# Patient Record
Sex: MALE | Race: WHITE | NOT HISPANIC OR LATINO | Employment: FULL TIME | URBAN - METROPOLITAN AREA
[De-identification: names, ages, dates, MRNs, and addresses within clinical notes are randomized per-mention and may not be internally consistent; named-entity substitution may affect disease eponyms.]

---

## 2017-01-10 ENCOUNTER — OFFICE VISIT (OUTPATIENT)
Dept: FAMILY MEDICINE CLINIC | Facility: CLINIC | Age: 52
End: 2017-01-10

## 2017-01-10 VITALS
HEART RATE: 83 BPM | BODY MASS INDEX: 37.03 KG/M2 | SYSTOLIC BLOOD PRESSURE: 120 MMHG | RESPIRATION RATE: 20 BRPM | OXYGEN SATURATION: 98 % | TEMPERATURE: 98.3 F | WEIGHT: 250 LBS | DIASTOLIC BLOOD PRESSURE: 70 MMHG | HEIGHT: 69 IN

## 2017-01-10 DIAGNOSIS — J20.9 ACUTE BRONCHITIS, UNSPECIFIED ORGANISM: Primary | ICD-10-CM

## 2017-01-10 PROCEDURE — 99213 OFFICE O/P EST LOW 20 MIN: CPT | Performed by: FAMILY MEDICINE

## 2017-01-10 RX ORDER — CEFUROXIME AXETIL 250 MG/1
250 TABLET ORAL 2 TIMES DAILY
Qty: 20 TABLET | Refills: 0 | Status: SHIPPED | OUTPATIENT
Start: 2017-01-10 | End: 2017-04-07

## 2017-01-10 NOTE — MR AVS SNAPSHOT
Tre Lau   1/10/2017 11:00 AM   Office Visit    Dept Phone:  [phone]   Encounter #:  [id number]    Provider:  Kana Farris MD   Department:  [facility] MED                Your Full Care Plan              Today's Medication Changes          These changes are accurate as of: 1/10/17 10:46 AM.  If you have any questions, ask your nurse or doctor.               New Medication(s)Ordered:     cefuroxime 250 MG tablet   Commonly known as:  CEFTIN   Take 1 tablet by mouth 2 (Two) Times a Day.   Started by:  Kana Farris MD       HYDROcodone-homatropine 5-1.5 MG/5ML syrup   Commonly known as:  HYCODAN   Take 5 mL by mouth Every 6 (Six) Hours As Needed for cough. 1 tsp Q 6 Hr prn   Started by:  Kana Farris MD            Where to Get Your Medications      These medications were sent to [facility] [address] [place] [address] [id number] AT [facility] - [phone]  - [phone]   [id number], [facility] [address]    Hours:  24-hours Phone:  [phone]     cefuroxime 250 MG tablet         You can get these medications from any pharmacy     Bring a paper prescription for each of these medications     HYDROcodone-homatropine 5-1.5 MG/5ML syrup                  Your Updated Medication List          This list is accurate as of: 1/10/17 10:46 AM.  Always use your most recent med list.                aspirin 81 MG tablet       atorvastatin 20 MG tablet   Commonly known as:  LIPITOR   TAKE 1 TABLET BY MOUTH EVERY DAY       cefuroxime 250 MG tablet   Commonly known as:  CEFTIN   Take 1 tablet by mouth 2 (Two) Times a Day.       HYDROcodone-homatropine 5-1.5 MG/5ML syrup   Commonly known as:  HYCODAN   Take 5 mL by mouth Every 6 (Six) Hours As Needed for cough. 1 tsp Q 6 Hr prn       metoprolol tartrate 25 MG tablet   Commonly known as:  LOPRESSOR   TAKE 1 TABLET BY MOUTH TWICE DAILY       "valsartan 80 MG tablet   Commonly known as:  DIOVAN   TAKE 1 TABLET BY MOUTH DAILY               You Were Diagnosed With        Codes Comments    Acute bronchitis, unspecified organism    -  Primary ICD-10-CM: J20.9  ICD-9-CM: 466.0       Instructions     None    Patient Instructions History      Upcoming Appointments     Visit Type Date Time Department    OFFICE VISIT 1/10/2017 11:00 AM ASHWIN ARTHUR      [employer] Signup     [facility] [employer] allows you to send messages to your doctor, view your test results, renew your prescriptions, schedule appointments, and more. To sign up, go to [employer] and click on the Sign Up Now link in the New User? box. Enter your [employer] Activation Code exactly as it appears below along with the last four digits of your Social Security Number and your Date of Birth () to complete the sign-up process. If you do not sign up before the expiration date, you must request a new code.    [employer] Activation Code: [email or web address]  Expires: 2017 10:46 AM    If you have questions, you can email [email or web address] or call [phone] to talk to our [employer] staff. Remember, [employer] is NOT to be used for urgent needs. For medical emergencies, dial 911.               Other Info from Your Visit           Your Appointments     Richy 10, 2017 11:00 AM EST   Office Visit with Kana Farris MD   [facility] (--)    [address] [place] [id number] [phone]           Arrive 15 minutes prior to appointment.              Allergies     No Known Allergies      Reason for Visit     Sinusitis     Cough           Vital Signs     Blood Pressure Pulse Temperature Respirations Height Weight    120/70 (BP Location: Left arm, Patient Position: Sitting) 83 98.3 °F (36.8 °C) (Oral) 20 69\" (175.3 cm) 250 lb (113 kg)    Oxygen Saturation Body Mass Index Smoking Status             98% 36.92 kg/m2 Former Smoker       "   Problems and Diagnoses Noted     Acute bronchitis    -  Primary

## 2017-01-10 NOTE — PROGRESS NOTES
SUBJECTIVE:  The patient is a 51-year-old white male who is here with a three-day history of productive greenish yellow sputum cough.  He is coughing so hard he gets a headache.  He is unaware of any fever.  He hurts from coughing but denies myalgias.  He had a flu shot.  No GI symptoms.  He has pressure in his face.     PAST MEDICAL HISTORY:  Reviewed.    REVIEW OF SYSTEMS:  Please see above; 14 point ROS otherwise negative.      OBJECTIVE: Vitals signs are reviewed and are stable.    HEENT: PERRLA.   Throat red.  TMs look okay.  Neck:  Supple.    Lungs:  Rhonchi are present in the left lung field.  No wheezes or rales.  Heart:  Regular rate and rhythm.    Abdomen:   Soft, nontender.    Extremities:  No cyanosis, clubbing or edema.      ASSESSMENT:     Acute bronchitis    PLAN:   Ceftin 250 twice a day.  Hydromet 1 teaspoon every 6 hours when necessary.  Mucinex.  Follow-up in a couple days if no better.  Notify sooner if worse or problems.    Much of this encounter note is an electronic transcription/translation of spoken language to printed text.  The electronic translation of spoken language may permit erroneous, or at times, nonsensical words or phrases to be inadvertently transcribed.  Although I have reviewed the note for such errors, some may still exist.

## 2017-01-18 RX ORDER — VALSARTAN 80 MG/1
TABLET ORAL
Qty: 30 TABLET | Refills: 0 | Status: SHIPPED | OUTPATIENT
Start: 2017-01-18 | End: 2017-02-16 | Stop reason: SDUPTHER

## 2017-02-08 RX ORDER — ATORVASTATIN CALCIUM 20 MG/1
TABLET, FILM COATED ORAL
Qty: 90 TABLET | Refills: 0 | Status: SHIPPED | OUTPATIENT
Start: 2017-02-08 | End: 2017-07-04 | Stop reason: SDUPTHER

## 2017-02-16 RX ORDER — VALSARTAN 80 MG/1
TABLET ORAL
Qty: 30 TABLET | Refills: 0 | Status: SHIPPED | OUTPATIENT
Start: 2017-02-16 | End: 2017-03-14 | Stop reason: SDUPTHER

## 2017-03-14 RX ORDER — VALSARTAN 80 MG/1
TABLET ORAL
Qty: 30 TABLET | Refills: 0 | Status: SHIPPED | OUTPATIENT
Start: 2017-03-14 | End: 2017-04-09 | Stop reason: SDUPTHER

## 2017-04-07 ENCOUNTER — OFFICE VISIT (OUTPATIENT)
Dept: CARDIOLOGY | Facility: CLINIC | Age: 52
End: 2017-04-07

## 2017-04-07 VITALS
BODY MASS INDEX: 38.51 KG/M2 | WEIGHT: 260 LBS | HEART RATE: 56 BPM | SYSTOLIC BLOOD PRESSURE: 114 MMHG | HEIGHT: 69 IN | DIASTOLIC BLOOD PRESSURE: 72 MMHG

## 2017-04-07 DIAGNOSIS — I48.0 PAROXYSMAL ATRIAL FIBRILLATION (HCC): Primary | ICD-10-CM

## 2017-04-07 DIAGNOSIS — I10 ESSENTIAL HYPERTENSION: ICD-10-CM

## 2017-04-07 PROCEDURE — 93000 ELECTROCARDIOGRAM COMPLETE: CPT | Performed by: INTERNAL MEDICINE

## 2017-04-07 PROCEDURE — 99213 OFFICE O/P EST LOW 20 MIN: CPT | Performed by: INTERNAL MEDICINE

## 2017-04-07 NOTE — PROGRESS NOTES
Date of Office Visit: 17  Encounter Provider: Dilshad Mann MD  Place of Service: [facility] CARDIOLOGY  Patient Name: Tre Lau  :1965      Chief Complaint   Patient presents with   • Atrial Fibrillation     History of Present Illness  HPI Comments: The patient is a 51-year-old gentleman who presented to the hospital with tachycardia,  shortness of breath, and some atypical chest pain, dizziness. He was found to be in rapid  atrial fibrillation. He converted back to sinus rhythm spontaneously, he ruled out. We  did a stress echocardiogram on him on 2015 that at rest showed no real significant  abnormality and with stress was normal. His TSH was negative. He was started on  metoprolol and aspirin. He comes in for follow up. He has been doing well. He has had  no further episodes of palpitations. No near syncope or stacy syncope. No chest pain or  pressure.  He's had no stroke type symptoms and no bleeding.  He is just now starting to get back and exercise she's worked out a structure so we can do that and take care of his child.  He also got a kayak is can a start using that.    Atrial Fibrillation   Symptoms are negative for dizziness and weakness. Past medical history includes atrial fibrillation and hyperlipidemia.   Hypertension   Pertinent negatives include no blurred vision, headaches or malaise/fatigue.   Hyperlipidemia   Pertinent negatives include no focal weakness or myalgias.         Past Medical History:   Diagnosis Date   • Atrial fibrillation    • Cervical discitis    • Hyperlipidemia    • Hypertension          Past Surgical History:   Procedure Laterality Date   • CERVICAL FUSION      Dr. Quan Reynolds   • CHOLECYSTECTOMY     • KNEE SURGERY Right 1985    X2, 1986         Current Outpatient Prescriptions on File Prior to Visit   Medication Sig Dispense Refill   • aspirin 81 MG tablet Take 1 tablet by mouth daily.     • atorvastatin (LIPITOR)  20 MG tablet TAKE 1 TABLET BY MOUTH EVERY DAY 90 tablet 0   • metoprolol tartrate (LOPRESSOR) 25 MG tablet TAKE 1 TABLET BY MOUTH TWICE DAILY 60 tablet 3   • valsartan (DIOVAN) 80 MG tablet TAKE 1 TABLET BY MOUTH EVERY DAY 30 tablet 0   • [DISCONTINUED] cefuroxime (CEFTIN) 250 MG tablet Take 1 tablet by mouth 2 (Two) Times a Day. 20 tablet 0   • [DISCONTINUED] HYDROcodone-homatropine (HYCODAN) 5-1.5 MG/5ML syrup Take 5 mL by mouth Every 6 (Six) Hours As Needed for cough. 1 tsp Q 6 Hr prn 120 mL 0     No current facility-administered medications on file prior to visit.          Social History     Social History   • Marital status:      Spouse name: N/A   • Number of children: 0   • Years of education: N/A     Occupational History   •       Social History Main Topics   • Smoking status: Former Smoker     Packs/day: 1.00     Years: 22.00     Types: Cigarettes     Quit date: 1/10/2007   • Smokeless tobacco: Not on file   • Alcohol use Yes      Comment: social   • Drug use: Not on file   • Sexual activity: Not on file     Other Topics Concern   • Not on file     Social History Narrative       Family History   Problem Relation Age of Onset   • Breast cancer Mother 47   • Diabetes Father    • Hypertension Father    • Heart attack Paternal Uncle    • Heart disease Paternal Grandmother    • Heart disease Paternal Grandfather          Review of Systems   Constitution: Negative for decreased appetite, diaphoresis, fever, weakness, malaise/fatigue, weight gain and weight loss.   HENT: Negative for congestion, headaches, hearing loss, nosebleeds and tinnitus.    Eyes: Negative for blurred vision, double vision, vision loss in left eye, vision loss in right eye and visual disturbance.   Cardiovascular:        As noted in HPI   Respiratory:        As noted HPI   Endocrine: Negative for cold intolerance and heat intolerance.   Hematologic/Lymphatic: Negative for bleeding problem. Does not bruise/bleed easily.  "  Skin: Negative for color change, flushing, itching and rash.   Musculoskeletal: Negative for arthritis, back pain, joint pain, joint swelling, muscle weakness and myalgias.   Gastrointestinal: Negative for bloating, abdominal pain, constipation, diarrhea, dysphagia, heartburn, hematemesis, hematochezia, melena, nausea and vomiting.   Genitourinary: Negative for bladder incontinence, dysuria, frequency, nocturia and urgency.   Neurological: Negative for dizziness, focal weakness, light-headedness, loss of balance, numbness, paresthesias and vertigo.   Psychiatric/Behavioral: Negative for depression, memory loss and substance abuse.       Procedures      ECG 12 Lead  Date/Time: 4/7/2017 11:21 AM  Performed by: MOOSE ARNOLD  Authorized by: MOOSE ARNOLD   Comparison: compared with previous ECG   Similar to previous ECG  Rhythm: sinus rhythm  Rate: normal  QRS axis: normal                 Objective:    /72  Pulse 56  Ht 69\" (175.3 cm)  Wt 260 lb (118 kg)  BMI 38.4 kg/m2       Physical Exam  Physical Exam   Constitutional: He is oriented to person, place, and time. He appears well-developed and well-nourished. No distress.   HENT:   Head: Normocephalic.   Eyes: Conjunctivae are normal. Pupils are equal, round, and reactive to light. No scleral icterus.   Neck: Normal carotid pulses, no hepatojugular reflux and no JVD present. Carotid bruit is not present. No tracheal deviation, no edema and no erythema present. No thyromegaly present.   Cardiovascular: Normal rate, regular rhythm, S1 normal, S2 normal, normal heart sounds and intact distal pulses.   No extrasystoles are present. PMI is not displaced.  Exam reveals no gallop, no distant heart sounds and no friction rub.    No murmur heard.  Pulses:       Carotid pulses are 2+ on the right side, and 2+ on the left side.       Radial pulses are 2+ on the right side, and 2+ on the left side.        Femoral pulses are 2+ on the right side, and 2+ on the " left side.       Dorsalis pedis pulses are 2+ on the right side, and 2+ on the left side.        Posterior tibial pulses are 2+ on the right side, and 2+ on the left side.   Pulmonary/Chest: Effort normal and breath sounds normal. No respiratory distress. He has no decreased breath sounds. He has no wheezes. He has no rhonchi. He has no rales. He exhibits no tenderness.   Abdominal: Soft. Bowel sounds are normal. He exhibits no distension and no mass. There is no hepatosplenomegaly. There is no tenderness. There is no rebound and no guarding.   Musculoskeletal: He exhibits no edema, tenderness or deformity.   Neurological: He is alert and oriented to person, place, and time.   Skin: Skin is warm and dry. No rash noted. He is not diaphoretic. No cyanosis or erythema. No pallor. Nails show no clubbing.   Psychiatric: He has a normal mood and affect. His speech is normal and behavior is normal. Judgment and thought content normal.           Assessment:   1. This is a 50-year-old gentleman with paroxysmal atrial fibrillation.  Atrial Fibrillation and Atrial Flutter  Assessment  • The patient has paroxysmal atrial fibrillation  • This is non-valvular in etiology  • The patient's CHADS2-VASc score is 1  • A [id number]-ENGs score of 1 is considered an intermediate risk for a thromboembolic event  • Aspirin prescribed    Plan  • Attempt to maintain sinus rhythm  • Continue aspirin for antithrombotic therapy, bleeding issues discussed  • Continue beta blocker for rhythm control   no recurrence. He is to continue the same and we'll see us in follow-up in a year.    2. Hypertension, blood pressure adequately controlled.  3. Hyperlipidemia, on target dose atorvastatin.          Plan:

## 2017-04-10 RX ORDER — VALSARTAN 80 MG/1
TABLET ORAL
Qty: 30 TABLET | Refills: 0 | Status: SHIPPED | OUTPATIENT
Start: 2017-04-10 | End: 2017-08-07 | Stop reason: SDUPTHER

## 2017-07-05 RX ORDER — ATORVASTATIN CALCIUM 20 MG/1
TABLET, FILM COATED ORAL
Qty: 90 TABLET | Refills: 0 | Status: SHIPPED | OUTPATIENT
Start: 2017-07-05 | End: 2018-04-03 | Stop reason: SDUPTHER

## 2017-08-07 RX ORDER — VALSARTAN 80 MG/1
TABLET ORAL
Qty: 30 TABLET | Refills: 0 | Status: SHIPPED | OUTPATIENT
Start: 2017-08-07 | End: 2017-09-08 | Stop reason: SDUPTHER

## 2017-09-08 RX ORDER — VALSARTAN 80 MG/1
TABLET ORAL
Qty: 30 TABLET | Refills: 0 | Status: SHIPPED | OUTPATIENT
Start: 2017-09-08 | End: 2017-10-11 | Stop reason: SDUPTHER

## 2017-10-11 RX ORDER — VALSARTAN 80 MG/1
TABLET ORAL
Qty: 30 TABLET | Refills: 0 | Status: SHIPPED | OUTPATIENT
Start: 2017-10-11 | End: 2017-11-08 | Stop reason: SDUPTHER

## 2017-11-08 RX ORDER — VALSARTAN 80 MG/1
TABLET ORAL
Qty: 30 TABLET | Refills: 0 | Status: SHIPPED | OUTPATIENT
Start: 2017-11-08 | End: 2017-12-07 | Stop reason: SDUPTHER

## 2017-12-07 RX ORDER — VALSARTAN 80 MG/1
TABLET ORAL
Qty: 30 TABLET | Refills: 0 | Status: SHIPPED | OUTPATIENT
Start: 2017-12-07 | End: 2018-01-04 | Stop reason: SDUPTHER

## 2018-01-04 RX ORDER — VALSARTAN 80 MG/1
TABLET ORAL
Qty: 30 TABLET | Refills: 0 | Status: SHIPPED | OUTPATIENT
Start: 2018-01-04 | End: 2018-02-04 | Stop reason: SDUPTHER

## 2018-02-05 RX ORDER — VALSARTAN 80 MG/1
TABLET ORAL
Qty: 30 TABLET | Refills: 0 | Status: SHIPPED | OUTPATIENT
Start: 2018-02-05 | End: 2018-03-01 | Stop reason: SDUPTHER

## 2018-03-01 RX ORDER — VALSARTAN 80 MG/1
TABLET ORAL
Qty: 30 TABLET | Refills: 0 | Status: SHIPPED | OUTPATIENT
Start: 2018-03-01 | End: 2018-04-07 | Stop reason: SDUPTHER

## 2018-04-03 ENCOUNTER — OFFICE VISIT (OUTPATIENT)
Dept: CARDIOLOGY | Facility: CLINIC | Age: 53
End: 2018-04-03

## 2018-04-03 ENCOUNTER — TELEPHONE (OUTPATIENT)
Dept: CARDIOLOGY | Facility: CLINIC | Age: 53
End: 2018-04-03

## 2018-04-03 ENCOUNTER — LAB (OUTPATIENT)
Dept: LAB | Facility: HOSPITAL | Age: 53
End: 2018-04-03

## 2018-04-03 VITALS
HEIGHT: 69 IN | DIASTOLIC BLOOD PRESSURE: 88 MMHG | BODY MASS INDEX: 39.1 KG/M2 | SYSTOLIC BLOOD PRESSURE: 128 MMHG | HEART RATE: 60 BPM | WEIGHT: 264 LBS

## 2018-04-03 DIAGNOSIS — E78.49 OTHER HYPERLIPIDEMIA: ICD-10-CM

## 2018-04-03 DIAGNOSIS — I48.0 PAROXYSMAL ATRIAL FIBRILLATION (HCC): Primary | ICD-10-CM

## 2018-04-03 DIAGNOSIS — I10 ESSENTIAL HYPERTENSION: ICD-10-CM

## 2018-04-03 LAB
ALBUMIN SERPL-MCNC: 4.2 G/DL (ref 3.5–5.2)
ALP SERPL-CCNC: 73 U/L (ref 39–117)
ALT SERPL W P-5'-P-CCNC: 31 U/L (ref 1–41)
AST SERPL-CCNC: 21 U/L (ref 1–40)
BILIRUB CONJ SERPL-MCNC: <0.2 MG/DL (ref 0–0.3)
BILIRUB INDIRECT SERPL-MCNC: NORMAL MG/DL
BILIRUB SERPL-MCNC: 0.3 MG/DL (ref 0.1–1.2)
CHOLEST SERPL-MCNC: 239 MG/DL (ref 0–200)
HDLC SERPL-MCNC: 49 MG/DL (ref 40–60)
LDLC SERPL CALC-MCNC: 155 MG/DL (ref 0–100)
LDLC/HDLC SERPL: 3.16 {RATIO}
PROT SERPL-MCNC: 7.3 G/DL (ref 6–8.5)
TRIGL SERPL-MCNC: 175 MG/DL (ref 0–150)
VLDLC SERPL-MCNC: 35 MG/DL (ref 5–40)

## 2018-04-03 PROCEDURE — 80061 LIPID PANEL: CPT

## 2018-04-03 PROCEDURE — 99214 OFFICE O/P EST MOD 30 MIN: CPT | Performed by: NURSE PRACTITIONER

## 2018-04-03 PROCEDURE — 80076 HEPATIC FUNCTION PANEL: CPT

## 2018-04-03 PROCEDURE — 36415 COLL VENOUS BLD VENIPUNCTURE: CPT

## 2018-04-03 PROCEDURE — 93000 ELECTROCARDIOGRAM COMPLETE: CPT | Performed by: NURSE PRACTITIONER

## 2018-04-03 RX ORDER — ATORVASTATIN CALCIUM 20 MG/1
20 TABLET, FILM COATED ORAL DAILY
Qty: 90 TABLET | Refills: 3 | Status: SHIPPED | OUTPATIENT
Start: 2018-04-03 | End: 2019-03-13 | Stop reason: SDUPTHER

## 2018-04-08 RX ORDER — VALSARTAN 80 MG/1
TABLET ORAL
Qty: 30 TABLET | Refills: 0 | Status: SHIPPED | OUTPATIENT
Start: 2018-04-08 | End: 2018-05-15 | Stop reason: SDUPTHER

## 2018-05-09 RX ORDER — VALSARTAN 80 MG/1
TABLET ORAL
Qty: 30 TABLET | Refills: 0 | OUTPATIENT
Start: 2018-05-09

## 2018-05-15 ENCOUNTER — OFFICE VISIT (OUTPATIENT)
Dept: FAMILY MEDICINE CLINIC | Facility: CLINIC | Age: 53
End: 2018-05-15

## 2018-05-15 VITALS
BODY MASS INDEX: 38.95 KG/M2 | HEIGHT: 69 IN | TEMPERATURE: 98.3 F | OXYGEN SATURATION: 96 % | HEART RATE: 85 BPM | DIASTOLIC BLOOD PRESSURE: 84 MMHG | WEIGHT: 263 LBS | SYSTOLIC BLOOD PRESSURE: 124 MMHG

## 2018-05-15 DIAGNOSIS — I10 ESSENTIAL HYPERTENSION: Primary | ICD-10-CM

## 2018-05-15 DIAGNOSIS — E78.5 HYPERLIPIDEMIA, UNSPECIFIED HYPERLIPIDEMIA TYPE: ICD-10-CM

## 2018-05-15 PROCEDURE — 99213 OFFICE O/P EST LOW 20 MIN: CPT | Performed by: FAMILY MEDICINE

## 2018-05-15 RX ORDER — VALSARTAN 80 MG/1
80 TABLET ORAL DAILY
Qty: 90 TABLET | Refills: 2 | Status: SHIPPED | OUTPATIENT
Start: 2018-05-15 | End: 2018-08-13

## 2018-05-15 NOTE — PROGRESS NOTES
SUBJECTIVE:  The patient is a 52-year-old white male's here for follow-up.  He had labs 3-4 weeks ago however to this visit.  He's doing well.  He sees his cardiologist for atrial fibrillation.  He has a history of hypertension.  His knees need to be replaced.  It's hard for him to exercise.  He is up-to-date on colonoscopy and prostate exam      PAST MEDICAL HISTORY:  Reviewed.    REVIEW OF SYSTEMS:  Please see above; 14 point ROS negative.      OBJECTIVE: Vitals signs are reviewed and are stable.    HEENT: PERRLA.   Neck:  Supple.   Lungs:  Clear.    Heart:  Regular rate and rhythm.   Abdomen:   Soft, nontender.   Extremities:  No cyanosis, clubbing or edema.     ASSESSMENT:     Hypertension  Hyperlipidemia  Paroxysmal atrial fibrillation  Obesity     PLAN: Healthy lifestyle discussed.  Labs are reviewed.  He will follow up as scheduled.  He will call if problems.    Dictated utilizing Dragon dictation.

## 2018-05-16 ENCOUNTER — TELEPHONE (OUTPATIENT)
Dept: FAMILY MEDICINE CLINIC | Facility: CLINIC | Age: 53
End: 2018-05-16

## 2018-08-13 ENCOUNTER — TELEPHONE (OUTPATIENT)
Dept: FAMILY MEDICINE CLINIC | Facility: CLINIC | Age: 53
End: 2018-08-13

## 2018-08-13 RX ORDER — IRBESARTAN 75 MG/1
75 TABLET ORAL NIGHTLY
Qty: 90 TABLET | Refills: 3 | Status: SHIPPED | OUTPATIENT
Start: 2018-08-13 | End: 2019-05-12 | Stop reason: SDUPTHER

## 2019-03-13 DIAGNOSIS — E78.2 MIXED HYPERLIPIDEMIA: Primary | ICD-10-CM

## 2019-03-13 DIAGNOSIS — Z51.81 THERAPEUTIC DRUG MONITORING: ICD-10-CM

## 2019-03-13 RX ORDER — ATORVASTATIN CALCIUM 20 MG/1
20 TABLET, FILM COATED ORAL DAILY
Qty: 30 TABLET | Refills: 1 | Status: SHIPPED | OUTPATIENT
Start: 2019-03-13 | End: 2019-05-12 | Stop reason: SDUPTHER

## 2019-03-13 NOTE — TELEPHONE ENCOUNTER
Will approve 30 day supply. Please call and tel lhim to have labs checked prior to seeing Dr. Mann next month. He needs higher dose based on 2018 lipid panel however, he had not been taking his medication at that time either.      AL

## 2019-03-13 NOTE — TELEPHONE ENCOUNTER
Toña,     The last lipid panel was done 4/3/2018.  I see a message on 4/3/18 in which you document that patient was to call you back regarding the abnormal lipid panel. I did not see that he ever called back for the results.  Please advise if Ok to refill Atorvastatin 20 mg or if it needs to be changed.  Unfortunately, it is time to have him repeat a lipid panel.  Please  Advise.  Thank you/ DIEGO

## 2019-03-14 NOTE — TELEPHONE ENCOUNTER
Called and left a message for patient with instructions.  I asked that he please call back to confirm he received the message./ DIEGO

## 2019-04-01 ENCOUNTER — OFFICE VISIT (OUTPATIENT)
Dept: CARDIOLOGY | Facility: CLINIC | Age: 54
End: 2019-04-01

## 2019-04-01 VITALS
BODY MASS INDEX: 34.72 KG/M2 | RESPIRATION RATE: 16 BRPM | HEIGHT: 69 IN | DIASTOLIC BLOOD PRESSURE: 88 MMHG | WEIGHT: 234.4 LBS | HEART RATE: 70 BPM | SYSTOLIC BLOOD PRESSURE: 152 MMHG

## 2019-04-01 DIAGNOSIS — E78.2 MIXED HYPERLIPIDEMIA: ICD-10-CM

## 2019-04-01 DIAGNOSIS — I48.0 PAROXYSMAL ATRIAL FIBRILLATION (HCC): Primary | ICD-10-CM

## 2019-04-01 DIAGNOSIS — I10 ESSENTIAL HYPERTENSION: ICD-10-CM

## 2019-04-01 PROCEDURE — 93000 ELECTROCARDIOGRAM COMPLETE: CPT | Performed by: INTERNAL MEDICINE

## 2019-04-01 PROCEDURE — 99213 OFFICE O/P EST LOW 20 MIN: CPT | Performed by: INTERNAL MEDICINE

## 2019-04-01 NOTE — PROGRESS NOTES
Date of Office Visit: 19  Encounter Provider: Dilshad Mann MD  Place of Service: [facility] CARDIOLOGY  Patient Name: Tre Lau  :1965  Referral Provider:No ref. provider found      No chief complaint on file.    History of Present Illness  The patient is a 53-year-old gentleman who presented to the hospital with tachycardia,  shortness of breath, and some atypical chest pain, dizziness. He was found to be in rapid  atrial fibrillation. He converted back to sinus rhythm spontaneously, he ruled out. We  did a stress echocardiogram on him on 2015 that at rest showed no real significant  abnormality and with stress was normal. His TSH was negative. He was started on  metoprolol and aspirin. He comes in for follow up. The patient denies chest pain, pressure and heaviness. No shortness of breath, othopnea or PND. No palpitations, near syncope or syncope. No stroke type symptoms like paralysis, paresthesia, abrupt vision loss and dysarthria. No bleeding like blood in the stool or dark stools.  Exercising but hopes to get back to it he has been dieting and lost 20 pounds.  And unfortunately has been under some increased stress is going through a divorce now.  He does keep fairly active with his 3-year-old son.      Atrial Fibrillation   Symptoms are negative for dizziness and weakness. Past medical history includes atrial fibrillation and hyperlipidemia.   Hypertension   Pertinent negatives include no blurred vision, headaches or malaise/fatigue.   Hyperlipidemia   Pertinent negatives include no focal weakness or myalgias.         Past Medical History:   Diagnosis Date   • Atrial fibrillation (CMS/HCC)    • Cervical discitis    • Hyperlipidemia    • Hypertension    • PAF (paroxysmal atrial fibrillation) (CMS/HCC)          Past Surgical History:   Procedure Laterality Date   • CERVICAL FUSION      Dr. Quan Reynolds   • CHOLECYSTECTOMY     • KNEE SURGERY Right  1985    X2, 1986         Current Outpatient Medications on File Prior to Visit   Medication Sig Dispense Refill   • aspirin 81 MG tablet Take 1 tablet by mouth daily.     • atorvastatin (LIPITOR) 20 MG tablet TAKE 1 TABLET BY MOUTH DAILY 30 tablet 1   • irbesartan (AVAPRO) 75 MG tablet Take 1 tablet by mouth Every Night. 90 tablet 3   • metoprolol tartrate (LOPRESSOR) 25 MG tablet TAKE 1 TABLET BY MOUTH TWICE DAILY 60 tablet 11     No current facility-administered medications on file prior to visit.          Social History     Socioeconomic History   • Marital status:      Spouse name: Not on file   • Number of children: 0   • Years of education: Not on file   • Highest education level: Not on file   Occupational History   • Occupation:    Tobacco Use   • Smoking status: Former Smoker     Packs/day: 1.00     Years: 22.00     Pack years: 22.00     Types: Cigarettes     Last attempt to quit: 1/10/2007     Years since quittin.2   • Tobacco comment: cafffeine use   Substance and Sexual Activity   • Alcohol use: Yes     Comment: social   • Drug use: No       Family History   Problem Relation Age of Onset   • Breast cancer Mother 47   • Diabetes Father    • Hypertension Father    • Heart attack Paternal Uncle    • Heart disease Paternal Grandmother    • Heart disease Paternal Grandfather          Review of Systems   Constitution: Negative for decreased appetite, diaphoresis, fever, weakness, malaise/fatigue, weight gain and weight loss.   HENT: Negative for congestion, hearing loss, nosebleeds and tinnitus.    Eyes: Negative for blurred vision, double vision, vision loss in left eye, vision loss in right eye and visual disturbance.   Cardiovascular:        As noted in HPI   Respiratory:        As noted HPI   Endocrine: Negative for cold intolerance and heat intolerance.   Hematologic/Lymphatic: Negative for bleeding problem. Does not bruise/bleed easily.   Skin: Negative for color change, flushing,  "itching and rash.   Musculoskeletal: Positive for joint pain. Negative for arthritis, back pain, joint swelling, muscle weakness and myalgias.   Gastrointestinal: Negative for bloating, abdominal pain, constipation, diarrhea, dysphagia, heartburn, hematemesis, hematochezia, melena, nausea and vomiting.   Genitourinary: Negative for bladder incontinence, dysuria, frequency, nocturia and urgency.   Neurological: Negative for dizziness, focal weakness, headaches, light-headedness, loss of balance, numbness, paresthesias and vertigo.   Psychiatric/Behavioral: Negative for depression, memory loss and substance abuse.       Procedures      ECG 12 Lead  Date/Time: 4/1/2019 12:10 PM  Performed by: Dilshad Mann MD  Authorized by: Dilshad Mann MD   Comparison: compared with previous ECG   Similar to previous ECG  Rhythm: sinus rhythm  Rate: normal  QRS axis: normal                  Objective:    /88 (BP Location: Left arm, Patient Position: Sitting, Cuff Size: Adult)   Pulse 70   Resp 16   Ht 175.3 cm (69\")   Wt 106 kg (234 lb 6.4 oz)   BMI 34.61 kg/m²        Physical Exam  Physical Exam   Constitutional: He is oriented to person, place, and time. He appears well-developed and well-nourished. No distress.   HENT:   Head: Normocephalic.   Eyes: Conjunctivae are normal. Pupils are equal, round, and reactive to light. No scleral icterus.   Neck: Normal carotid pulses, no hepatojugular reflux and no JVD present. Carotid bruit is not present. No tracheal deviation, no edema and no erythema present. No thyromegaly present.   Cardiovascular: Normal rate, regular rhythm, S1 normal, S2 normal, normal heart sounds and intact distal pulses.  No extrasystoles are present. PMI is not displaced. Exam reveals no gallop, no distant heart sounds and no friction rub.   No murmur heard.  Pulses:       Carotid pulses are 2+ on the right side, and 2+ on the left side.       Radial pulses are 2+ on the right side, and 2+ on " the left side.        Femoral pulses are 2+ on the right side, and 2+ on the left side.       Dorsalis pedis pulses are 2+ on the right side, and 2+ on the left side.        Posterior tibial pulses are 2+ on the right side, and 2+ on the left side.   Pulmonary/Chest: Effort normal and breath sounds normal. No respiratory distress. He has no decreased breath sounds. He has no wheezes. He has no rhonchi. He has no rales. He exhibits no tenderness.   Abdominal: Soft. Bowel sounds are normal. He exhibits no distension and no mass. There is no hepatosplenomegaly. There is no tenderness. There is no rebound and no guarding.   Musculoskeletal: He exhibits no edema, tenderness or deformity.   Neurological: He is alert and oriented to person, place, and time.   Skin: Skin is warm and dry. No rash noted. He is not diaphoretic. No cyanosis or erythema. No pallor. Nails show no clubbing.   Psychiatric: He has a normal mood and affect. His speech is normal and behavior is normal. Judgment and thought content normal.           Assessment:   1. This is a 53-year-old gentleman with paroxysmal atrial fibrillation.  Atrial Fibrillation and Atrial Flutter  Assessment  • The patient has paroxysmal atrial fibrillation  • This is non-valvular in etiology  • The patient's CHADS2-VASc score is 1  • A [id number]-TDPa score of 1 is considered an intermediate risk for a thromboembolic event  • Aspirin prescribed    Plan  • Attempt to maintain sinus rhythm  • Continue aspirin for antithrombotic therapy, bleeding issues discussed  • Continue beta blocker for rhythm control  Doing well.  Continue the same we will see him again in follow-up in a year.     2. Hypertension, blood pressure adequately controlled.  3. Hyperlipidemia, on target dose atorvastatin.           Plan:

## 2019-05-13 RX ORDER — IRBESARTAN 75 MG/1
75 TABLET ORAL NIGHTLY
Qty: 90 TABLET | Refills: 0 | Status: SHIPPED | OUTPATIENT
Start: 2019-05-13 | End: 2019-12-03 | Stop reason: SDUPTHER

## 2019-05-14 RX ORDER — ATORVASTATIN CALCIUM 20 MG/1
20 TABLET, FILM COATED ORAL DAILY
Qty: 90 TABLET | Refills: 1 | Status: SHIPPED | OUTPATIENT
Start: 2019-05-14 | End: 2019-11-03 | Stop reason: SDUPTHER

## 2019-11-04 RX ORDER — ATORVASTATIN CALCIUM 20 MG/1
20 TABLET, FILM COATED ORAL DAILY
Qty: 90 TABLET | Refills: 0 | Status: SHIPPED | OUTPATIENT
Start: 2019-11-04 | End: 2020-02-03

## 2019-12-03 RX ORDER — IRBESARTAN 75 MG/1
75 TABLET ORAL NIGHTLY
Qty: 21 TABLET | Refills: 0 | Status: SHIPPED | OUTPATIENT
Start: 2019-12-03 | End: 2020-01-03

## 2020-01-03 RX ORDER — IRBESARTAN 75 MG/1
75 TABLET ORAL NIGHTLY
Qty: 30 TABLET | Refills: 0 | Status: SHIPPED | OUTPATIENT
Start: 2020-01-03 | End: 2020-01-06 | Stop reason: SDUPTHER

## 2020-01-03 NOTE — TELEPHONE ENCOUNTER
Called and spoke with pt, he will make an appt. I told him I would give him #30 but he needs an appt before next refill.

## 2020-01-06 RX ORDER — IRBESARTAN 75 MG/1
75 TABLET ORAL NIGHTLY
Qty: 7 TABLET | Refills: 0 | Status: SHIPPED | OUTPATIENT
Start: 2020-01-06 | End: 2020-02-05

## 2020-01-13 ENCOUNTER — OFFICE VISIT (OUTPATIENT)
Dept: FAMILY MEDICINE CLINIC | Facility: CLINIC | Age: 55
End: 2020-01-13

## 2020-01-13 VITALS
SYSTOLIC BLOOD PRESSURE: 128 MMHG | DIASTOLIC BLOOD PRESSURE: 70 MMHG | HEIGHT: 69 IN | WEIGHT: 234.4 LBS | BODY MASS INDEX: 34.72 KG/M2 | HEART RATE: 67 BPM | TEMPERATURE: 98.2 F | OXYGEN SATURATION: 98 %

## 2020-01-13 DIAGNOSIS — I48.0 PAROXYSMAL ATRIAL FIBRILLATION (HCC): ICD-10-CM

## 2020-01-13 DIAGNOSIS — I10 ESSENTIAL HYPERTENSION: Primary | ICD-10-CM

## 2020-01-13 DIAGNOSIS — E78.2 MIXED HYPERLIPIDEMIA: ICD-10-CM

## 2020-01-13 PROCEDURE — 99213 OFFICE O/P EST LOW 20 MIN: CPT | Performed by: FAMILY MEDICINE

## 2020-01-13 NOTE — PROGRESS NOTES
SUBJECTIVE:  The patient is a 84-year-old white male.  He has hypertension hyperlipidemia and paroxysmal atrial fibrillation.  He is due lab work.  He has gone through a divorce since his last visit.    PAST MEDICAL HISTORY:  Reviewed.    REVIEW OF SYSTEMS:  Please see above; all others reviewed and are egative.      OBJECTIVE:   Vitals signs are reviewed and are stable.    General:  Well-nourished.  Alert and oriented x3 in no acute distress.  HEENT: PERRLA.   Neck:  Supple.   Lungs:  Clear.    Heart:  Regular rate and rhythm.   Abdomen:   Soft, nontender.   Extremities:  No cyanosis, clubbing or edema.   Neurological:  Grossly intact without motor or sensory deficits.     ASSESSMENT:    Hypertension  Hyperlipidemia  PLAN: CBC CMP fasting lipids ordered.  He is referred for colonoscopy.  He will schedule his prostate exam with his urologist.    Dictated utilizing Dragon dictation.

## 2020-01-16 LAB
ALBUMIN SERPL-MCNC: 4.3 G/DL (ref 3.5–5.2)
ALBUMIN/GLOB SERPL: 1.4 G/DL
ALP SERPL-CCNC: 66 U/L (ref 39–117)
ALT SERPL W P-5'-P-CCNC: 19 U/L (ref 1–41)
ANION GAP SERPL CALCULATED.3IONS-SCNC: 10.6 MMOL/L (ref 5–15)
AST SERPL-CCNC: 16 U/L (ref 1–40)
BASOPHILS # BLD AUTO: 0.1 10*3/MM3 (ref 0–0.2)
BASOPHILS NFR BLD AUTO: 0.7 % (ref 0–1.5)
BILIRUB SERPL-MCNC: 0.5 MG/DL (ref 0.2–1.2)
BUN BLD-MCNC: 14 MG/DL (ref 6–20)
BUN/CREAT SERPL: 14.4 (ref 7–25)
CALCIUM SPEC-SCNC: 9.3 MG/DL (ref 8.6–10.5)
CHLORIDE SERPL-SCNC: 103 MMOL/L (ref 98–107)
CHOLEST SERPL-MCNC: 155 MG/DL (ref 0–200)
CO2 SERPL-SCNC: 26.4 MMOL/L (ref 22–29)
CREAT BLD-MCNC: 0.97 MG/DL (ref 0.76–1.27)
DEPRECATED RDW RBC AUTO: 41.8 FL (ref 37–54)
EOSINOPHIL # BLD AUTO: 0.27 10*3/MM3 (ref 0–0.4)
EOSINOPHIL NFR BLD AUTO: 2 % (ref 0.3–6.2)
ERYTHROCYTE [DISTWIDTH] IN BLOOD BY AUTOMATED COUNT: 12.6 % (ref 12.3–15.4)
GFR SERPL CREATININE-BSD FRML MDRD: 81 ML/MIN/1.73
GLOBULIN UR ELPH-MCNC: 3.1 GM/DL
GLUCOSE BLD-MCNC: 98 MG/DL (ref 65–99)
HCT VFR BLD AUTO: 42.7 % (ref 37.5–51)
HDLC SERPL-MCNC: 48 MG/DL (ref 40–60)
HGB BLD-MCNC: 14.5 G/DL (ref 13–17.7)
IMM GRANULOCYTES # BLD AUTO: 0.07 10*3/MM3 (ref 0–0.05)
IMM GRANULOCYTES NFR BLD AUTO: 0.5 % (ref 0–0.5)
LDLC SERPL CALC-MCNC: 78 MG/DL (ref 0–100)
LDLC/HDLC SERPL: 1.63 {RATIO}
LYMPHOCYTES # BLD AUTO: 2.71 10*3/MM3 (ref 0.7–3.1)
LYMPHOCYTES NFR BLD AUTO: 19.8 % (ref 19.6–45.3)
MCH RBC QN AUTO: 30.7 PG (ref 26.6–33)
MCHC RBC AUTO-ENTMCNC: 34 G/DL (ref 31.5–35.7)
MCV RBC AUTO: 90.5 FL (ref 79–97)
MONOCYTES # BLD AUTO: 1.26 10*3/MM3 (ref 0.1–0.9)
MONOCYTES NFR BLD AUTO: 9.2 % (ref 5–12)
NEUTROPHILS # BLD AUTO: 9.28 10*3/MM3 (ref 1.7–7)
NEUTROPHILS NFR BLD AUTO: 67.8 % (ref 42.7–76)
NRBC BLD AUTO-RTO: 0 /100 WBC (ref 0–0.2)
PLATELET # BLD AUTO: 210 10*3/MM3 (ref 140–450)
PMV BLD AUTO: 12.2 FL (ref 6–12)
POTASSIUM BLD-SCNC: 5 MMOL/L (ref 3.5–5.2)
PROT SERPL-MCNC: 7.4 G/DL (ref 6–8.5)
RBC # BLD AUTO: 4.72 10*6/MM3 (ref 4.14–5.8)
SODIUM BLD-SCNC: 140 MMOL/L (ref 136–145)
TRIGL SERPL-MCNC: 145 MG/DL (ref 0–150)
VLDLC SERPL-MCNC: 29 MG/DL (ref 5–40)
WBC NRBC COR # BLD: 13.69 10*3/MM3 (ref 3.4–10.8)

## 2020-01-16 PROCEDURE — 36415 COLL VENOUS BLD VENIPUNCTURE: CPT | Performed by: FAMILY MEDICINE

## 2020-01-16 PROCEDURE — 80053 COMPREHEN METABOLIC PANEL: CPT | Performed by: FAMILY MEDICINE

## 2020-01-16 PROCEDURE — 80061 LIPID PANEL: CPT | Performed by: FAMILY MEDICINE

## 2020-01-16 PROCEDURE — 85025 COMPLETE CBC W/AUTO DIFF WBC: CPT | Performed by: FAMILY MEDICINE

## 2020-01-20 DIAGNOSIS — D72.829 LEUKOCYTOSIS, UNSPECIFIED TYPE: Primary | ICD-10-CM

## 2020-01-31 ENCOUNTER — LAB (OUTPATIENT)
Dept: FAMILY MEDICINE CLINIC | Facility: CLINIC | Age: 55
End: 2020-01-31

## 2020-01-31 DIAGNOSIS — D72.829 LEUKOCYTOSIS, UNSPECIFIED TYPE: ICD-10-CM

## 2020-01-31 LAB
BASOPHILS # BLD AUTO: 0.1 10*3/MM3 (ref 0–0.2)
BASOPHILS NFR BLD AUTO: 0.9 % (ref 0–1.5)
DEPRECATED RDW RBC AUTO: 42.2 FL (ref 37–54)
EOSINOPHIL # BLD AUTO: 0.21 10*3/MM3 (ref 0–0.4)
EOSINOPHIL NFR BLD AUTO: 1.9 % (ref 0.3–6.2)
ERYTHROCYTE [DISTWIDTH] IN BLOOD BY AUTOMATED COUNT: 12.8 % (ref 12.3–15.4)
HCT VFR BLD AUTO: 42.3 % (ref 37.5–51)
HGB BLD-MCNC: 14.4 G/DL (ref 13–17.7)
IMM GRANULOCYTES # BLD AUTO: 0.06 10*3/MM3 (ref 0–0.05)
IMM GRANULOCYTES NFR BLD AUTO: 0.5 % (ref 0–0.5)
LYMPHOCYTES # BLD AUTO: 2.49 10*3/MM3 (ref 0.7–3.1)
LYMPHOCYTES NFR BLD AUTO: 22.3 % (ref 19.6–45.3)
MCH RBC QN AUTO: 30.6 PG (ref 26.6–33)
MCHC RBC AUTO-ENTMCNC: 34 G/DL (ref 31.5–35.7)
MCV RBC AUTO: 90 FL (ref 79–97)
MONOCYTES # BLD AUTO: 1.11 10*3/MM3 (ref 0.1–0.9)
MONOCYTES NFR BLD AUTO: 10 % (ref 5–12)
NEUTROPHILS # BLD AUTO: 7.18 10*3/MM3 (ref 1.7–7)
NEUTROPHILS NFR BLD AUTO: 64.4 % (ref 42.7–76)
NRBC BLD AUTO-RTO: 0 /100 WBC (ref 0–0.2)
PLATELET # BLD AUTO: 200 10*3/MM3 (ref 140–450)
PMV BLD AUTO: 12.2 FL (ref 6–12)
RBC # BLD AUTO: 4.7 10*6/MM3 (ref 4.14–5.8)
WBC NRBC COR # BLD: 11.15 10*3/MM3 (ref 3.4–10.8)

## 2020-01-31 PROCEDURE — 36415 COLL VENOUS BLD VENIPUNCTURE: CPT | Performed by: FAMILY MEDICINE

## 2020-01-31 PROCEDURE — 85025 COMPLETE CBC W/AUTO DIFF WBC: CPT | Performed by: FAMILY MEDICINE

## 2020-02-03 DIAGNOSIS — D72.829 LEUKOCYTOSIS, UNSPECIFIED TYPE: Primary | ICD-10-CM

## 2020-02-03 RX ORDER — ATORVASTATIN CALCIUM 20 MG/1
20 TABLET, FILM COATED ORAL DAILY
Qty: 90 TABLET | Refills: 1 | Status: SHIPPED | OUTPATIENT
Start: 2020-02-03 | End: 2020-07-27

## 2020-02-05 RX ORDER — IRBESARTAN 75 MG/1
75 TABLET ORAL NIGHTLY
Qty: 30 TABLET | Refills: 0 | Status: SHIPPED | OUTPATIENT
Start: 2020-02-05 | End: 2020-03-07

## 2020-03-07 RX ORDER — IRBESARTAN 75 MG/1
75 TABLET ORAL NIGHTLY
Qty: 30 TABLET | Refills: 0 | Status: SHIPPED | OUTPATIENT
Start: 2020-03-07 | End: 2020-04-01

## 2020-03-23 ENCOUNTER — TELEPHONE (OUTPATIENT)
Dept: CARDIOLOGY | Facility: CLINIC | Age: 55
End: 2020-03-23

## 2020-03-23 NOTE — TELEPHONE ENCOUNTER
LVM recommending that if he feels well to either agree to a telephone call or video chat at the time of his upcoming appt. If he would like to reschedule then he is to call back and let us know what he wants.

## 2020-03-30 NOTE — TELEPHONE ENCOUNTER
Patient returned call. He is going to do a telephone visit with you on Thursday at 10am. Appointment has been confirmed.     Thanks   Stephanie

## 2020-04-01 RX ORDER — IRBESARTAN 75 MG/1
75 TABLET ORAL NIGHTLY
Qty: 30 TABLET | Refills: 0 | Status: SHIPPED | OUTPATIENT
Start: 2020-04-01 | End: 2020-05-01

## 2020-04-02 ENCOUNTER — OFFICE VISIT (OUTPATIENT)
Dept: CARDIOLOGY | Facility: CLINIC | Age: 55
End: 2020-04-02

## 2020-04-02 VITALS — WEIGHT: 229 LBS | BODY MASS INDEX: 33.82 KG/M2

## 2020-04-02 DIAGNOSIS — I48.0 PAROXYSMAL ATRIAL FIBRILLATION (HCC): Primary | ICD-10-CM

## 2020-04-02 DIAGNOSIS — I10 ESSENTIAL HYPERTENSION: ICD-10-CM

## 2020-04-02 DIAGNOSIS — E78.2 MIXED HYPERLIPIDEMIA: ICD-10-CM

## 2020-04-02 PROCEDURE — 99213 OFFICE O/P EST LOW 20 MIN: CPT | Performed by: NURSE PRACTITIONER

## 2020-04-02 NOTE — PROGRESS NOTES
Date of Office Visit: 20  Encounter Provider: RYAN Solano  Place of Service: [facility] CARDIOLOGY  Patient Name: Tre Lau  :1965    Chief Complaint   Patient presents with   • Atrial Fibrillation   • Hypertension   :     HPI: Tre Lau is a 54 y.o. male  with history of hypertension, hyperlipidemia, and paroxysmal atrial fibrillation.He is followed by Dr. Mann and I will visit with him in follow up today.      In 2015 he presented with tachycardia, shortness of breath, atypical chest pain, and dizziness.  He was in rapid atrial fibrillation and converted back to sinus rhythm spontaneously.  He had a stress echocardiogram that was normal.  His TSH was negative.  He was started on metoprolol and aspirin.       We interact today via telephone.  His blood pressure last week 138/70. He is unable to check today. Since last office visit, he ha changed his diet and reports a greater than 20 pound weight loss. He has been doing martial arts and cardio routinely. He is tolerating all medications. No symptoms of stroke and no bleeding issues. NO chest pain, shortness of breath, near syncope syncope or swelling.     No Known Allergies    Past Medical History:   Diagnosis Date   • Atrial fibrillation (CMS/HCC)    • Cervical discitis    • Hyperlipidemia    • Hypertension    • PAF (paroxysmal atrial fibrillation) (CMS/HCC)        Past Surgical History:   Procedure Laterality Date   • CERVICAL FUSION      Dr. Quan Reynolds   • CHOLECYSTECTOMY     • KNEE SURGERY Right 1985    X2,          Family and social history reviewed.     ROS  All other systems were reviewed and are negative          Objective:     Vitals:    20 0949   Weight: 104 kg (229 lb)     Body mass index is 33.82 kg/m².    PHYSICAL EXAM:  Physical Exam  Unable to perform  Procedures  Unable to perform  Current Outpatient Medications   Medication Sig Dispense Refill   • aspirin 81 MG  tablet Take 1 tablet by mouth daily.     • atorvastatin (LIPITOR) 20 MG tablet TAKE 1 TABLET BY MOUTH DAILY 90 tablet 1   • irbesartan (AVAPRO) 75 MG tablet TAKE 1 TABLET BY MOUTH EVERY NIGHT 30 tablet 0   • metoprolol tartrate (LOPRESSOR) 25 MG tablet TAKE 1 TABLET BY MOUTH TWICE DAILY 60 tablet 5     No current facility-administered medications for this visit.      Assessment:       Diagnosis Plan   1. Paroxysmal atrial fibrillation (CMS/HCC)     2. Essential hypertension     3. Mixed hyperlipidemia          No orders of the defined types were placed in this encounter.        Plan:         1. 54-year-old man with paroxysmal atrial fibrillation. CHADS2-VASc score is 1 He was diagnosed in February of 2015 and spontaneously converted to normal sinus rhythm.  He is maintained on Lopressor 25 mg twice daily and aspirin 81 mg.  2. Hypertension reported to be adequately controlled   3. Hyperlipidemia on Lipitor 20 mg LDL 78 in 01/2020  4. History of obesity- he has lost over 20 pounds I commended hi for changing diet and exercising routinely he is to keep doing well with this.      Follow up in one year call with questions or concerns.     This patient has consented to a telehealth visit via telephone. The visit was scheduled as a telephone visit to comply with patient safety concerns in accordance with CDC recommendations.  All vitals recorded within this visit are reported by the patient.  I spent  15 minutes in total including but not limited to the 5 minutes spent in direct conversation with this patient.       It has been a pleasure to participate in this patient's care.      Thank you,  RYAN Solano      **I used Dragon to dictate this note:**

## 2020-05-01 RX ORDER — IRBESARTAN 75 MG/1
75 TABLET ORAL NIGHTLY
Qty: 30 TABLET | Refills: 0 | Status: SHIPPED | OUTPATIENT
Start: 2020-05-01 | End: 2020-06-16

## 2020-06-16 RX ORDER — IRBESARTAN 75 MG/1
75 TABLET ORAL NIGHTLY
Qty: 30 TABLET | Refills: 0 | Status: SHIPPED | OUTPATIENT
Start: 2020-06-16 | End: 2020-07-25

## 2020-07-25 RX ORDER — IRBESARTAN 75 MG/1
75 TABLET ORAL NIGHTLY
Qty: 30 TABLET | Refills: 0 | Status: SHIPPED | OUTPATIENT
Start: 2020-07-25 | End: 2020-07-26

## 2020-07-26 RX ORDER — IRBESARTAN 75 MG/1
75 TABLET ORAL NIGHTLY
Qty: 90 TABLET | Refills: 3 | Status: SHIPPED | OUTPATIENT
Start: 2020-07-26 | End: 2021-08-14

## 2020-07-26 RX ORDER — IRBESARTAN 75 MG/1
75 TABLET ORAL NIGHTLY
Qty: 30 TABLET | Refills: 0 | Status: SHIPPED | OUTPATIENT
Start: 2020-07-26 | End: 2020-12-09 | Stop reason: SDUPTHER

## 2020-07-27 RX ORDER — ATORVASTATIN CALCIUM 20 MG/1
20 TABLET, FILM COATED ORAL DAILY
Qty: 90 TABLET | Refills: 0 | Status: SHIPPED | OUTPATIENT
Start: 2020-07-27 | End: 2020-10-20

## 2020-10-20 RX ORDER — ATORVASTATIN CALCIUM 20 MG/1
20 TABLET, FILM COATED ORAL DAILY
Qty: 90 TABLET | Refills: 2 | Status: SHIPPED | OUTPATIENT
Start: 2020-10-20 | End: 2021-07-19

## 2020-12-09 ENCOUNTER — OFFICE VISIT (OUTPATIENT)
Dept: CARDIOLOGY | Facility: CLINIC | Age: 55
End: 2020-12-09

## 2020-12-09 VITALS
SYSTOLIC BLOOD PRESSURE: 124 MMHG | WEIGHT: 231.6 LBS | BODY MASS INDEX: 34.3 KG/M2 | HEIGHT: 69 IN | HEART RATE: 67 BPM | DIASTOLIC BLOOD PRESSURE: 88 MMHG

## 2020-12-09 DIAGNOSIS — I10 ESSENTIAL HYPERTENSION: ICD-10-CM

## 2020-12-09 DIAGNOSIS — E78.2 MIXED HYPERLIPIDEMIA: ICD-10-CM

## 2020-12-09 DIAGNOSIS — I48.0 PAROXYSMAL ATRIAL FIBRILLATION (HCC): Primary | ICD-10-CM

## 2020-12-09 PROCEDURE — 99214 OFFICE O/P EST MOD 30 MIN: CPT | Performed by: INTERNAL MEDICINE

## 2020-12-09 PROCEDURE — 93000 ELECTROCARDIOGRAM COMPLETE: CPT | Performed by: INTERNAL MEDICINE

## 2020-12-09 NOTE — PROGRESS NOTES
Date of Office Visit: 20  Encounter Provider: Dilshad Mann MD  Place of Service: [facility] CARDIOLOGY  Patient Name: Tre Lau  :1965  Referral Provider:Dilshad Mann MD      Chief Complaint   Patient presents with   • Pre-op Exam     History of Present Illness  The patient is a 55-year-old gentleman who presented to the hospital with tachycardia,  shortness of breath, and some atypical chest pain, dizziness. He was found to be in rapid  atrial fibrillation. He converted back to sinus rhythm spontaneously, he ruled out. We  did a stress echocardiogram on him on 2015 that at rest showed no real significant  abnormality and with stress was normal. His TSH was negative. He was started on  metoprolol and aspirin. He comes in for follow up. The patient denies chest pain, pressure and heaviness. No shortness of breath, othopnea or PND. No palpitations, near syncope or syncope. No stroke type symptoms like paralysis, paresthesia, abrupt vision loss and dysarthria. No bleeding like blood in the stool or dark stools.  He overall feels like he is doing great.  He still doing a cardio workout 3 days a week for 30 to 60 minutes without problems he is going to have left knee replacement in January.    Atrial Fibrillation  Symptoms are negative for dizziness and weakness. Past medical history includes atrial fibrillation and hyperlipidemia.   Hypertension  Pertinent negatives include no blurred vision, headaches or malaise/fatigue.   Hyperlipidemia  Pertinent negatives include no focal weakness or myalgias.         Past Medical History:   Diagnosis Date   • Atrial fibrillation (CMS/HCC)    • Cervical discitis    • Hyperlipidemia    • Hypertension    • PAF (paroxysmal atrial fibrillation) (CMS/HCC)          Past Surgical History:   Procedure Laterality Date   • CERVICAL FUSION      Dr. Quan Reynolds   • CHOLECYSTECTOMY     • KNEE SURGERY Right 1985    X2,           Current Outpatient Medications on File Prior to Visit   Medication Sig Dispense Refill   • aspirin 81 MG tablet Take 1 tablet by mouth daily.     • atorvastatin (LIPITOR) 20 MG tablet TAKE 1 TABLET BY MOUTH DAILY 90 tablet 2   • irbesartan (AVAPRO) 75 MG tablet TAKE 1 TABLET BY MOUTH EVERY NIGHT 90 tablet 3   • metoprolol tartrate (LOPRESSOR) 25 MG tablet TAKE 1 TABLET BY MOUTH TWICE DAILY 60 tablet 5   • [DISCONTINUED] irbesartan (AVAPRO) 75 MG tablet TAKE 1 TABLET BY MOUTH EVERY NIGHT 30 tablet 0     No current facility-administered medications on file prior to visit.          Social History     Socioeconomic History   • Marital status:      Spouse name: Not on file   • Number of children: 0   • Years of education: Not on file   • Highest education level: Not on file   Occupational History   • Occupation:    Tobacco Use   • Smoking status: Former Smoker     Packs/day: 1.00     Years: 22.00     Pack years: 22.00     Types: Cigarettes     Quit date: 1/10/2007     Years since quittin.9   • Smokeless tobacco: Never Used   • Tobacco comment: cafffeine use- coffee   Substance and Sexual Activity   • Alcohol use: Not Currently     Comment: social   • Drug use: No   Lifestyle   • Physical activity     Days per week: 3 days     Minutes per session: 30 min   • Stress: Not at all       Family History   Problem Relation Age of Onset   • Breast cancer Mother 47   • Diabetes Father    • Hypertension Father    • Heart attack Paternal Uncle    • Heart disease Paternal Grandmother    • Heart disease Paternal Grandfather          Review of Systems   Constitution: Negative for decreased appetite, diaphoresis, fever, malaise/fatigue, weight gain and weight loss.   HENT: Negative for congestion, hearing loss, nosebleeds and tinnitus.    Eyes: Negative for blurred vision, double vision, vision loss in left eye, vision loss in right eye and visual disturbance.   Cardiovascular:        As noted in HPI  "  Respiratory:        As noted HPI   Endocrine: Negative for cold intolerance and heat intolerance.   Hematologic/Lymphatic: Negative for bleeding problem. Does not bruise/bleed easily.   Skin: Negative for color change, flushing, itching and rash.   Musculoskeletal: Positive for joint pain. Negative for arthritis, back pain, joint swelling, muscle weakness and myalgias.   Gastrointestinal: Negative for bloating, abdominal pain, constipation, diarrhea, dysphagia, heartburn, hematemesis, hematochezia, melena, nausea and vomiting.   Genitourinary: Negative for bladder incontinence, dysuria, frequency, nocturia and urgency.   Neurological: Negative for dizziness, focal weakness, headaches, light-headedness, loss of balance, numbness, paresthesias, vertigo and weakness.   Psychiatric/Behavioral: Negative for depression, memory loss and substance abuse.       Procedures      ECG 12 Lead    Date/Time: 12/9/2020 9:01 AM  Performed by: Dilshad Mann MD  Authorized by: Dilshad Mann MD   Comparison: compared with previous ECG   Rhythm: sinus rhythm  Ectopy: unifocal PVCs  Rate: normal  QRS axis: normal                  Objective:    /88 (BP Location: Right arm)   Pulse 67   Ht 175.3 cm (69\")   Wt 105 kg (231 lb 9.6 oz)   BMI 34.20 kg/m²        Physical Exam  Vitals signs reviewed.   Constitutional:       General: Not in acute distress.     Appearance: Well-developed. Not diaphoretic.   Eyes:      General: No scleral icterus.     Conjunctiva/sclera: Conjunctivae normal.      Pupils: Pupils are equal, round, and reactive to light.   HENT:      Head: Normocephalic.   Neck:      Musculoskeletal: No edema or erythema.      Thyroid: No thyromegaly.      Vascular: Normal carotid pulses. No carotid bruit, hepatojugular reflux or JVD.      Trachea: No tracheal deviation.   Pulmonary:      Effort: Pulmonary effort is normal. No respiratory distress.      Breath sounds: Normal breath sounds. No decreased breath " sounds. No wheezing. No rhonchi. No rales.   Chest:      Chest wall: Not tender to palpatation.   Cardiovascular:      PMI at left midclavicular line. Normal rate. Regular rhythm. S1 with normal intensity. S2 with normal intensity.      Murmurs: There is no murmur.      No gallop. No click. No rub.   Pulses:     Intact distal pulses.   Edema:     Peripheral edema absent.   Abdominal:      General: Bowel sounds are normal. There is no distension.      Palpations: Abdomen is soft. There is no abdominal mass.      Tenderness: There is no abdominal tenderness. There is no guarding or rebound.   Musculoskeletal:         General: No tenderness or deformity.      Extremities: No clubbing present.  Skin:     General: Skin is warm and dry. There is no cyanosis.      Coloration: Skin is not pale.      Findings: No erythema or rash.   Neurological:      Mental Status: Alert and oriented to person, place, and time.   Psychiatric:         Speech: Speech normal.         Behavior: Behavior normal.         Thought Content: Thought content normal.         Judgment: Judgment normal.             Assessment:   1. This is a 55-year-old gentleman with paroxysmal atrial fibrillation. The patient's CHADS2-VASc score is 1.  Remaining in sinus rhythm will continue the same see his skin and follow-up in 1 year.  2. Hypertension, blood pressure adequately controlled.  3. Hyperlipidemia, on target dose atorvastatin.    4.  Need for knee replacement surgery.  He is Willam's revised very low risk.  No further cardiac evaluation or treatment needed.  He can hold his aspirin 3 days prior to surgery.         Plan:

## 2021-07-19 RX ORDER — ATORVASTATIN CALCIUM 20 MG/1
20 TABLET, FILM COATED ORAL DAILY
Qty: 90 TABLET | Refills: 2 | Status: SHIPPED | OUTPATIENT
Start: 2021-07-19 | End: 2022-04-11

## 2021-08-14 RX ORDER — IRBESARTAN 75 MG/1
75 TABLET ORAL NIGHTLY
Qty: 90 TABLET | Refills: 3 | Status: SHIPPED | OUTPATIENT
Start: 2021-08-14 | End: 2022-02-14

## 2021-12-23 ENCOUNTER — OFFICE VISIT (OUTPATIENT)
Dept: CARDIOLOGY | Facility: CLINIC | Age: 56
End: 2021-12-23

## 2021-12-23 VITALS
WEIGHT: 234 LBS | SYSTOLIC BLOOD PRESSURE: 160 MMHG | HEIGHT: 69 IN | HEART RATE: 58 BPM | DIASTOLIC BLOOD PRESSURE: 80 MMHG | BODY MASS INDEX: 34.66 KG/M2

## 2021-12-23 DIAGNOSIS — E78.2 MIXED HYPERLIPIDEMIA: ICD-10-CM

## 2021-12-23 DIAGNOSIS — I48.0 PAROXYSMAL ATRIAL FIBRILLATION (HCC): Primary | ICD-10-CM

## 2021-12-23 DIAGNOSIS — I10 ESSENTIAL HYPERTENSION: ICD-10-CM

## 2021-12-23 PROCEDURE — 93000 ELECTROCARDIOGRAM COMPLETE: CPT | Performed by: NURSE PRACTITIONER

## 2021-12-23 PROCEDURE — 99214 OFFICE O/P EST MOD 30 MIN: CPT | Performed by: NURSE PRACTITIONER

## 2021-12-23 NOTE — PROGRESS NOTES
"Date of Office Visit: 21  Encounter Provider: RYAN Solano  [facility]  Patient Name: Tre Lau  :1965    Chief Complaint   Patient presents with   • Paroxysmal atrial fibrillation   • Follow-up   :     HPI: Tre Lau is a 56 y.o. male  with hypertension, hyperlipidemia, and paroxysmal atrial fibrillation.He is followed by Dr. Mann and I will visit with him in follow up today.      In 2015 he presented with tachycardia, shortness of breath, atypical chest pain, and dizziness.  He was in rapid atrial fibrillation and converted back to sinus rhythm spontaneously.  He had a stress echocardiogram that was normal.  His TSH was negative.  He was started on metoprolol and aspirin.       He presents today for annual reassessment.  He has been doing well and staying active.  He does martial arts and strengthening training.  He has not been able to check his blood pressure recently at home because he broke his machine.  The last time he checked it was approximately 135 systolic.  He reports 1 or 2 episodes of palpitation over the year.  This lasted a couple minutes.  He has no other complaints.  He has a nasal injury that originally was a spider bite.  He plans to discuss this with his PCP.  He is taking aspirin without any report of bleeding.          No Known Allergies        Family and social history reviewed.     ROS  All other systems were reviewed and are negative          Objective:     Vitals:    21 1108   BP: 160/80   BP Location: Left arm   Patient Position: Sitting   Pulse: 58   Weight: 106 kg (234 lb)   Height: 175.3 cm (69\")     Body mass index is 34.56 kg/m².    PHYSICAL EXAM:  Constitutional:       General: Not in acute distress.     Appearance: Well-developed. Not diaphoretic.   HENT:      Head: Normocephalic.   Pulmonary:      Effort: Pulmonary effort is normal. No respiratory distress.      Breath sounds: " Normal breath sounds. No wheezing. No rhonchi. No rales.   Cardiovascular:      Normal rate. Regular rhythm.   Pulses:     Radial: 2+ bilaterally.  Skin:     General: Skin is warm and dry. There is no cyanosis.      Findings: No rash.   Neurological:      Mental Status: Alert and oriented to person, place, and time.   Psychiatric:         Behavior: Behavior normal.         Thought Content: Thought content normal.         Judgment: Judgment normal.           ECG 12 Lead    Date/Time: 12/23/2021 11:41 AM  Performed by: Toña Juares APRN  Authorized by: Toña Juares APRN   Comparison: compared with previous ECG   Similar to previous ECG  Rhythm: sinus rhythm  Ectopy: unifocal PVCs  Rate: normal              Current Outpatient Medications   Medication Sig Dispense Refill   • aspirin 81 MG tablet Take 1 tablet by mouth daily.     • atorvastatin (LIPITOR) 20 MG tablet TAKE 1 TABLET BY MOUTH DAILY 90 tablet 2   • irbesartan (AVAPRO) 75 MG tablet TAKE 1 TABLET BY MOUTH EVERY NIGHT 90 tablet 3   • metoprolol tartrate (LOPRESSOR) 25 MG tablet Take 1 tablet by mouth 2 (Two) Times a Day. 60 tablet 11     No current facility-administered medications for this visit.     Assessment:       Diagnosis Plan   1. Paroxysmal atrial fibrillation (HCC)     2. Mixed hyperlipidemia          No orders of the defined types were placed in this encounter.        Plan:   1. 56-year-old man with paroxysmal atrial fibrillation. CHADS2-VASc score is 1 He was diagnosed in February of 2015 and spontaneously converted to normal sinus rhythm.  He is maintained on Lopressor 25 mg twice daily and aspirin 81 mg.  Continue the same  2. Hypertension reported to be better controlled at home.  He has been unable to check because his machine broke.  Advised to get a new machine to start checking and notify me if he has sustained elevated values  3. Hyperlipidemia on Lipitor 20 mg  4.  Obesity-BMI 34.56.  He is staying active and exercising  5.  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  See me again in 1 year call questions or concerns.          It has been a pleasure to participate in this patient's care.      Thank you,  RYAN Solano      **I used Dragon to dictate this note:**

## 2022-02-14 ENCOUNTER — OFFICE VISIT (OUTPATIENT)
Dept: FAMILY MEDICINE CLINIC | Facility: CLINIC | Age: 57
End: 2022-02-14

## 2022-02-14 VITALS
WEIGHT: 236.2 LBS | HEIGHT: 69 IN | DIASTOLIC BLOOD PRESSURE: 98 MMHG | OXYGEN SATURATION: 98 % | BODY MASS INDEX: 34.98 KG/M2 | HEART RATE: 69 BPM | TEMPERATURE: 98.6 F | SYSTOLIC BLOOD PRESSURE: 170 MMHG

## 2022-02-14 DIAGNOSIS — Z00.00 HEALTHCARE MAINTENANCE: Primary | ICD-10-CM

## 2022-02-14 DIAGNOSIS — Z12.11 COLON CANCER SCREENING: ICD-10-CM

## 2022-02-14 DIAGNOSIS — I48.0 PAROXYSMAL ATRIAL FIBRILLATION: ICD-10-CM

## 2022-02-14 DIAGNOSIS — E78.2 MIXED HYPERLIPIDEMIA: ICD-10-CM

## 2022-02-14 DIAGNOSIS — I10 ESSENTIAL HYPERTENSION: ICD-10-CM

## 2022-02-14 PROCEDURE — 99396 PREV VISIT EST AGE 40-64: CPT | Performed by: NURSE PRACTITIONER

## 2022-02-14 PROCEDURE — 99214 OFFICE O/P EST MOD 30 MIN: CPT | Performed by: NURSE PRACTITIONER

## 2022-02-14 RX ORDER — IRBESARTAN 150 MG/1
150 TABLET ORAL NIGHTLY
Qty: 90 TABLET | Refills: 0 | Status: SHIPPED | OUTPATIENT
Start: 2022-02-14 | End: 2022-03-31 | Stop reason: SDUPTHER

## 2022-02-14 NOTE — PATIENT INSTRUCTIONS
"https://www.nhlbi.nih.gov/files/docs/public/heart/dash_brief.pdf\">   DASH Eating Plan  DASH stands for Dietary Approaches to Stop Hypertension. The DASH eating plan is a healthy eating plan that has been shown to:  · Reduce high blood pressure (hypertension).  · Reduce your risk for type 2 diabetes, heart disease, and stroke.  · Help with weight loss.  What are tips for following this plan?  Reading food labels  · Check food labels for the amount of salt (sodium) per serving. Choose foods with less than 5 percent of the Daily Value of sodium. Generally, foods with less than 300 milligrams (mg) of sodium per serving fit into this eating plan.  · To find whole grains, look for the word \"whole\" as the first word in the ingredient list.  Shopping  · Buy products labeled as \"low-sodium\" or \"no salt added.\"  · Buy fresh foods. Avoid canned foods and pre-made or frozen meals.  Cooking  · Avoid adding salt when cooking. Use salt-free seasonings or herbs instead of table salt or sea salt. Check with your health care provider or pharmacist before using salt substitutes.  · Do not garcia foods. Cook foods using healthy methods such as baking, boiling, grilling, roasting, and broiling instead.  · Cook with heart-healthy oils, such as olive, canola, avocado, soybean, or sunflower oil.  Meal planning    · Eat a balanced diet that includes:  ? 4 or more servings of fruits and 4 or more servings of vegetables each day. Try to fill one-half of your plate with fruits and vegetables.  ? 6-8 servings of whole grains each day.  ? Less than 6 oz (170 g) of lean meat, poultry, or fish each day. A 3-oz (85-g) serving of meat is about the same size as a deck of cards. One egg equals 1 oz (28 g).  ? 2-3 servings of low-fat dairy each day. One serving is 1 cup (237 mL).  ? 1 serving of nuts, seeds, or beans 5 times each week.  ? 2-3 servings of heart-healthy fats. Healthy fats called omega-3 fatty acids are found in foods such as walnuts, " flaxseeds, fortified milks, and eggs. These fats are also found in cold-water fish, such as sardines, salmon, and mackerel.  · Limit how much you eat of:  ? Canned or prepackaged foods.  ? Food that is high in trans fat, such as some fried foods.  ? Food that is high in saturated fat, such as fatty meat.  ? Desserts and other sweets, sugary drinks, and other foods with added sugar.  ? Full-fat dairy products.  · Do not salt foods before eating.  · Do not eat more than 4 egg yolks a week.  · Try to eat at least 2 vegetarian meals a week.  · Eat more home-cooked food and less restaurant, buffet, and fast food.    Lifestyle  · When eating at a restaurant, ask that your food be prepared with less salt or no salt, if possible.  · If you drink alcohol:  ? Limit how much you use to:  § 0-1 drink a day for women who are not pregnant.  § 0-2 drinks a day for men.  ? Be aware of how much alcohol is in your drink. In the [place] one drink equals one 12 oz bottle of beer (355 mL), one 5 oz glass of wine (148 mL), or one 1½ oz glass of hard liquor (44 mL).  General information  · Avoid eating more than 2,300 mg of salt a day. If you have hypertension, you may need to reduce your sodium intake to 1,500 mg a day.  · Work with your health care provider to maintain a healthy body weight or to lose weight. Ask what an ideal weight is for you.  · Get at least 30 minutes of exercise that causes your heart to beat faster (aerobic exercise) most days of the week. Activities may include walking, swimming, or biking.  · Work with your health care provider or dietitian to adjust your eating plan to your individual calorie needs.  What foods should I eat?  Fruits  All fresh, dried, or frozen fruit. Canned fruit in natural juice (without added sugar).  Vegetables  Fresh or frozen vegetables (raw, steamed, roasted, or grilled). Low-sodium or reduced-sodium tomato and vegetable juice. Low-sodium or reduced-sodium tomato sauce and tomato paste.  Low-sodium or reduced-sodium canned vegetables.  Grains  Whole-grain or whole-wheat bread. Whole-grain or whole-wheat pasta. Brown rice. Oatmeal. Quinoa. Bulgur. Whole-grain and low-sodium cereals. Caity bread. Low-fat, low-sodium crackers. Whole-wheat flour tortillas.  Meats and other proteins  Skinless chicken or turkey. Ground chicken or turkey. Pork with fat trimmed off. Fish and seafood. Egg whites. Dried beans, peas, or lentils. Unsalted nuts, nut butters, and seeds. Unsalted canned beans. Lean cuts of beef with fat trimmed off. Low-sodium, lean precooked or cured meat, such as sausages or meat loaves.  Dairy  Low-fat (1%) or fat-free (skim) milk. Reduced-fat, low-fat, or fat-free cheeses. Nonfat, low-sodium ricotta or cottage cheese. Low-fat or nonfat yogurt. Low-fat, low-sodium cheese.  Fats and oils  Soft margarine without trans fats. Vegetable oil. Reduced-fat, low-fat, or light mayonnaise and salad dressings (reduced-sodium). Canola, safflower, olive, avocado, soybean, and sunflower oils. Avocado.  Seasonings and condiments  Herbs. Spices. Seasoning mixes without salt.  Other foods  Unsalted popcorn and pretzels. Fat-free sweets.  The items listed above may not be a complete list of foods and beverages you can eat. Contact a dietitian for more information.  What foods should I avoid?  Fruits  Canned fruit in a light or heavy syrup. Fried fruit. Fruit in cream or butter sauce.  Vegetables  Creamed or fried vegetables. Vegetables in a cheese sauce. Regular canned vegetables (not low-sodium or reduced-sodium). Regular canned tomato sauce and paste (not low-sodium or reduced-sodium). Regular tomato and vegetable juice (not low-sodium or reduced-sodium). Pickles. Olives.  Grains  Baked goods made with fat, such as croissants, muffins, or some breads. Dry pasta or rice meal packs.  Meats and other proteins  Fatty cuts of meat. Ribs. Fried meat. [place]. Bologna, salami, and other precooked or cured meats, such as  sausages or meat loaves. Fat from the back of a pig (fatback). Bratwurst. Salted nuts and seeds. Canned beans with added salt. Canned or smoked fish. Whole eggs or egg yolks. Chicken or turkey with skin.  Dairy  Whole or 2% milk, cream, and half-and-half. Whole or full-fat cream cheese. Whole-fat or sweetened yogurt. Full-fat cheese. Nondairy creamers. Whipped toppings. Processed cheese and cheese spreads.  Fats and oils  Butter. Stick margarine. Lard. Shortening. Ghee. [place] fat. Tropical oils, such as coconut, palm kernel, or palm oil.  Seasonings and condiments  Onion salt, garlic salt, seasoned salt, table salt, and sea salt. Worcestershire sauce. Tartar sauce. Barbecue sauce. Teriyaki sauce. Soy sauce, including reduced-sodium. Steak sauce. Canned and packaged gravies. Fish sauce. Oyster sauce. Cocktail sauce. Store-bought horseradish. Ketchup. Mustard. Meat flavorings and tenderizers. Bouillon cubes. Hot sauces. Pre-made or packaged marinades. Pre-made or packaged taco seasonings. Relishes. Regular salad dressings.  Other foods  Salted popcorn and pretzels.  The items listed above may not be a complete list of foods and beverages you should avoid. Contact a dietitian for more information.  Where to find more information  · National Heart, Lung[place]: www.nhlbi.nih.gov  · American Heart Association: www.heart.org  · Academy of Nutrition and Dietetics: www.eatright.org  · National Kidney Foundation: www.kidney.org  Summary  · The DASH eating plan is a healthy eating plan that has been shown to reduce high blood pressure (hypertension). It may also reduce your risk for type 2 diabetes, heart disease, and stroke.  · When on the DASH eating plan, aim to eat more fresh fruits and vegetables, whole grains, lean proteins, low-fat dairy, and heart-healthy fats.  · With the DASH eating plan, you should limit salt (sodium) intake to 2,300 mg a day. If you have hypertension, you may need to reduce your  "sodium intake to 1,500 mg a day.  · Work with your health care provider or dietitian to adjust your eating plan to your individual calorie needs.  This information is not intended to replace advice given to you by your health care provider. Make sure you discuss any questions you have with your health care provider.  Document Revised: 11/20/2020 Document Reviewed: 11/20/2020  [employer] Patient Education © 2021 [employer]  Hypertension, Adult  High blood pressure (hypertension) is when the force of blood pumping through the arteries is too strong. The arteries are the blood vessels that carry blood from the heart throughout the body. Hypertension forces the heart to work harder to pump blood and may cause arteries to become narrow or stiff. Untreated or uncontrolled hypertension can cause a heart attack, heart failure, a stroke, kidney disease, and other problems.  A blood pressure reading consists of a higher number over a lower number. Ideally, your blood pressure should be below 120/80. The first (\"top\") number is called the systolic pressure. It is a measure of the pressure in your arteries as your heart beats. The second (\"bottom\") number is called the diastolic pressure. It is a measure of the pressure in your arteries as the heart relaxes.  What are the causes?  The exact cause of this condition is not known. There are some conditions that result in or are related to high blood pressure.  What increases the risk?  Some risk factors for high blood pressure are under your control. The following factors may make you more likely to develop this condition:  · Smoking.  · Having type 2 diabetes mellitus, high cholesterol, or both.  · Not getting enough exercise or physical activity.  · Being overweight.  · Having too much fat, sugar, calories, or salt (sodium) in your diet.  · Drinking too much alcohol.  Some risk factors for high blood pressure may be difficult or impossible to change. Some of these factors " include:  · Having chronic kidney disease.  · Having a family history of high blood pressure.  · Age. Risk increases with age.  · Race. You may be at higher risk if you are .  · Gender. Men are at higher risk than women before age 45. After age 65, women are at higher risk than men.  · Having obstructive sleep apnea.  · Stress.  What are the signs or symptoms?  High blood pressure may not cause symptoms. Very high blood pressure (hypertensive crisis) may cause:  · Headache.  · Anxiety.  · Shortness of breath.  · Nosebleed.  · Nausea and vomiting.  · Vision changes.  · Severe chest pain.  · Seizures.  How is this diagnosed?  This condition is diagnosed by measuring your blood pressure while you are seated, with your arm resting on a flat surface, your legs uncrossed, and your feet flat on the floor. The cuff of the blood pressure monitor will be placed directly against the skin of your upper arm at the level of your heart. It should be measured at least twice using the same arm. Certain conditions can cause a difference in blood pressure between your right and left arms.  Certain factors can cause blood pressure readings to be lower or higher than normal for a short period of time:  · When your blood pressure is higher when you are in a health care provider's office than when you are at home, this is called white coat hypertension. Most people with this condition do not need medicines.  · When your blood pressure is higher at home than when you are in a health care provider's office, this is called masked hypertension. Most people with this condition may need medicines to control blood pressure.  If you have a high blood pressure reading during one visit or you have normal blood pressure with other risk factors, you may be asked to:  · Return on a different day to have your blood pressure checked again.  · Monitor your blood pressure at home for 1 week or longer.  If you are diagnosed with  hypertension, you may have other blood or imaging tests to help your health care provider understand your overall risk for other conditions.  How is this treated?  This condition is treated by making healthy lifestyle changes, such as eating healthy foods, exercising more, and reducing your alcohol intake. Your health care provider may prescribe medicine if lifestyle changes are not enough to get your blood pressure under control, and if:  · Your systolic blood pressure is above 130.  · Your diastolic blood pressure is above 80.  Your personal target blood pressure may vary depending on your medical conditions, your age, and other factors.  Follow these instructions at home:  Eating and drinking    · Eat a diet that is high in fiber and potassium, and low in sodium, added sugar, and fat. An example eating plan is called the DASH (Dietary Approaches to Stop Hypertension) diet. To eat this way:  ? Eat plenty of fresh fruits and vegetables. Try to fill one half of your plate at each meal with fruits and vegetables.  ? Eat whole grains, such as whole-wheat pasta, brown rice, or whole-grain bread. Fill about one fourth of your plate with whole grains.  ? Eat or drink low-fat dairy products, such as skim milk or low-fat yogurt.  ? Avoid fatty cuts of meat, processed or cured meats, and poultry with skin. Fill about one fourth of your plate with lean proteins, such as fish, chicken without skin, beans, eggs, or tofu.  ? Avoid pre-made and processed foods. These tend to be higher in sodium, added sugar, and fat.  · Reduce your daily sodium intake. Most people with hypertension should eat less than 1,500 mg of sodium a day.  · Do not drink alcohol if:  ? Your health care provider tells you not to drink.  ? You are pregnant, may be pregnant, or are planning to become pregnant.  · If you drink alcohol:  ? Limit how much you use to:  § 0-1 drink a day for women.  § 0-2 drinks a day for men.  ? Be aware of how much alcohol is in  your drink. In the [place] one drink equals one 12 oz bottle of beer (355 mL), one 5 oz glass of wine (148 mL), or one 1½ oz glass of hard liquor (44 mL).    Lifestyle    · Work with your health care provider to maintain a healthy body weight or to lose weight. Ask what an ideal weight is for you.  · Get at least 30 minutes of exercise most days of the week. Activities may include walking, swimming, or biking.  · Include exercise to strengthen your muscles (resistance exercise), such as Pilates or lifting weights, as part of your weekly exercise routine. Try to do these types of exercises for 30 minutes at least 3 days a week.  · Do not use any products that contain nicotine or tobacco, such as cigarettes, e-cigarettes, and chewing tobacco. If you need help quitting, ask your health care provider.  · Monitor your blood pressure at home as told by your health care provider.  · Keep all follow-up visits as told by your health care provider. This is important.    Medicines  · Take over-the-counter and prescription medicines only as told by your health care provider. Follow directions carefully. Blood pressure medicines must be taken as prescribed.  · Do not skip doses of blood pressure medicine. Doing this puts you at risk for problems and can make the medicine less effective.  · Ask your health care provider about side effects or reactions to medicines that you should watch for.  Contact a health care provider if you:  · Think you are having a reaction to a medicine you are taking.  · Have headaches that keep coming back (recurring).  · Feel dizzy.  · Have swelling in your ankles.  · Have trouble with your vision.  Get help right away if you:  · Develop a severe headache or confusion.  · Have unusual weakness or numbness.  · Feel faint.  · Have severe pain in your chest or abdomen.  · Vomit repeatedly.  · Have trouble breathing.  Summary  · Hypertension is when the force of blood pumping through your arteries is too  strong. If this condition is not controlled, it may put you at risk for serious complications.  · Your personal target blood pressure may vary depending on your medical conditions, your age, and other factors. For most people, a normal blood pressure is less than 120/80.  · Hypertension is treated with lifestyle changes, medicines, or a combination of both. Lifestyle changes include losing weight, eating a healthy, low-sodium diet, exercising more, and limiting alcohol.  This information is not intended to replace advice given to you by your health care provider. Make sure you discuss any questions you have with your health care provider.  Document Revised: 08/28/2019 Document Reviewed: 08/28/2019  [employer] Patient Education © 2021 [employer]

## 2022-02-14 NOTE — PROGRESS NOTES
"Chief Complaint  Hypertension    Subjective          Tre Lau presents to [facility]  History of Present Illness   56 yr old male, pt of Dr. Farris, new to me, presenting for annual exam and medication refill.  With hypertension, controlled with Avapro 75 mg QHS, monitors BP at home with average 160-180's/90's, denies CP, dizziness, HA, LE edema.  With hyperlipidemia, controlled with Lipitor 20 mg, last , Trigly 145, HDL 48, LDL 78 on 1/16/20.  With paroxysmal A-fib, takes metoprolol 25 mg BID, followed by RYAN Solano with Cardiology.  He has no prior colonoscopy, referral placed today.     Objective   Vital Signs:   /98 (BP Location: Left arm, Patient Position: Sitting, Cuff Size: Large Adult)   Pulse 69   Temp 98.6 °F (37 °C) (Infrared)   Ht 175.3 cm (69\")   Wt 107 kg (236 lb 3.2 oz)   SpO2 98%   BMI 34.88 kg/m²     Physical Exam  Vitals and nursing note reviewed.   Constitutional:       Appearance: He is well-developed.   HENT:      Head: Normocephalic.   Eyes:      Pupils: Pupils are equal, round, and reactive to light.   Cardiovascular:      Rate and Rhythm: Normal rate and regular rhythm.      Heart sounds: Normal heart sounds.   Pulmonary:      Effort: Pulmonary effort is normal.      Breath sounds: Normal breath sounds.   Abdominal:      General: Bowel sounds are normal.      Palpations: Abdomen is soft.   Musculoskeletal:         General: Normal range of motion.      Cervical back: Normal range of motion and neck supple.   Skin:     General: Skin is warm and dry.      Findings: No rash.   Neurological:      Mental Status: He is alert and oriented to person, place, and time.   Psychiatric:         Behavior: Behavior normal.         Thought Content: Thought content normal.         Judgment: Judgment normal.        Result Review :                 Assessment and Plan    Diagnoses and all orders for this visit:    1. Healthcare maintenance (Primary)  -   "   CBC & Differential; Future  -     Thyroid Panel With TSH; Future  -     Comprehensive metabolic panel; Future  -     Lipid Panel; Future  -     Hepatitis C Antibody; Future    2. Essential hypertension  -     CBC & Differential; Future  -     Thyroid Panel With TSH; Future  -     Comprehensive metabolic panel; Future  -     irbesartan (Avapro) 150 MG tablet; Take 1 tablet by mouth Every Night.  Dispense: 90 tablet; Refill: 0    3. Mixed hyperlipidemia  -     Lipid Panel; Future    4. Paroxysmal atrial fibrillation (HCC)  -     CBC & Differential; Future  -     Thyroid Panel With TSH; Future  -     Comprehensive metabolic panel; Future    5. Colon cancer screening  -     Ambulatory Referral For Screening Colonoscopy      Counseling was provided on nutrition, physical activity, development, and injury prevention, dental health, and safe sex practices patient verbalizes understanding no additional questions were asked.    Follow Up   Return in about 2 weeks (around 2/28/2022) for Recheck.  Patient was given instructions and counseling regarding his condition or for health maintenance advice. Please see specific information pulled into the AVS if appropriate.

## 2022-02-15 ENCOUNTER — PRE-PROCEDURE SCREENING (OUTPATIENT)
Dept: GASTROENTEROLOGY | Facility: CLINIC | Age: 57
End: 2022-02-15

## 2022-02-16 ENCOUNTER — LAB (OUTPATIENT)
Dept: FAMILY MEDICINE CLINIC | Facility: CLINIC | Age: 57
End: 2022-02-16

## 2022-02-16 DIAGNOSIS — I10 ESSENTIAL HYPERTENSION: ICD-10-CM

## 2022-02-16 DIAGNOSIS — Z00.00 HEALTHCARE MAINTENANCE: ICD-10-CM

## 2022-02-16 DIAGNOSIS — E78.2 MIXED HYPERLIPIDEMIA: ICD-10-CM

## 2022-02-16 DIAGNOSIS — I48.0 PAROXYSMAL ATRIAL FIBRILLATION: ICD-10-CM

## 2022-02-16 LAB
ALBUMIN SERPL-MCNC: 4.3 G/DL (ref 3.5–5.2)
ALBUMIN/GLOB SERPL: 1.5 G/DL
ALP SERPL-CCNC: 63 U/L (ref 39–117)
ALT SERPL W P-5'-P-CCNC: 20 U/L (ref 1–41)
ANION GAP SERPL CALCULATED.3IONS-SCNC: 12 MMOL/L (ref 5–15)
AST SERPL-CCNC: 18 U/L (ref 1–40)
BILIRUB SERPL-MCNC: 0.4 MG/DL (ref 0–1.2)
BUN SERPL-MCNC: 13 MG/DL (ref 6–20)
BUN/CREAT SERPL: 13.3 (ref 7–25)
CALCIUM SPEC-SCNC: 9.1 MG/DL (ref 8.6–10.5)
CHLORIDE SERPL-SCNC: 102 MMOL/L (ref 98–107)
CHOLEST SERPL-MCNC: 145 MG/DL (ref 0–200)
CO2 SERPL-SCNC: 25 MMOL/L (ref 22–29)
CREAT SERPL-MCNC: 0.98 MG/DL (ref 0.76–1.27)
ERYTHROCYTE [DISTWIDTH] IN BLOOD BY AUTOMATED COUNT: 13.3 % (ref 12.3–15.4)
GFR SERPL CREATININE-BSD FRML MDRD: 79 ML/MIN/1.73
GLOBULIN UR ELPH-MCNC: 2.8 GM/DL
GLUCOSE SERPL-MCNC: 96 MG/DL (ref 65–99)
HCT VFR BLD AUTO: 43.8 % (ref 37.5–51)
HCV AB SER DONR QL: NORMAL
HDLC SERPL-MCNC: 40 MG/DL (ref 40–60)
HGB BLD-MCNC: 14.1 G/DL (ref 13–17.7)
LDLC SERPL CALC-MCNC: 87 MG/DL (ref 0–100)
LDLC/HDLC SERPL: 2.16 {RATIO}
LYMPHOCYTES # BLD AUTO: 2.6 10*3/MM3 (ref 0.7–3.1)
LYMPHOCYTES NFR BLD AUTO: 32 % (ref 19.6–45.3)
MCH RBC QN AUTO: 29.4 PG (ref 26.6–33)
MCHC RBC AUTO-ENTMCNC: 32.3 G/DL (ref 31.5–35.7)
MCV RBC AUTO: 91 FL (ref 79–97)
MONOCYTES # BLD AUTO: 0.6 10*3/MM3 (ref 0.1–0.9)
MONOCYTES NFR BLD AUTO: 8 % (ref 5–12)
NEUTROPHILS NFR BLD AUTO: 4.9 10*3/MM3 (ref 1.7–7)
NEUTROPHILS NFR BLD AUTO: 60 % (ref 42.7–76)
PLATELET # BLD AUTO: 215 10*3/MM3 (ref 140–450)
PMV BLD AUTO: 8.3 FL (ref 6–12)
POTASSIUM SERPL-SCNC: 4.6 MMOL/L (ref 3.5–5.2)
PROT SERPL-MCNC: 7.1 G/DL (ref 6–8.5)
RBC # BLD AUTO: 4.81 10*6/MM3 (ref 4.14–5.8)
SODIUM SERPL-SCNC: 139 MMOL/L (ref 136–145)
T-UPTAKE NFR SERPL: 1.01 TBI (ref 0.8–1.3)
T4 SERPL-MCNC: 6.2 MCG/DL (ref 4.5–11.7)
TRIGL SERPL-MCNC: 93 MG/DL (ref 0–150)
TSH SERPL DL<=0.05 MIU/L-ACNC: 1.72 UIU/ML (ref 0.27–4.2)
VLDLC SERPL-MCNC: 18 MG/DL (ref 5–40)
WBC NRBC COR # BLD: 8.1 10*3/MM3 (ref 3.4–10.8)

## 2022-02-16 PROCEDURE — 36415 COLL VENOUS BLD VENIPUNCTURE: CPT | Performed by: NURSE PRACTITIONER

## 2022-02-16 PROCEDURE — 84436 ASSAY OF TOTAL THYROXINE: CPT | Performed by: NURSE PRACTITIONER

## 2022-02-16 PROCEDURE — 86803 HEPATITIS C AB TEST: CPT | Performed by: NURSE PRACTITIONER

## 2022-02-16 PROCEDURE — 80050 GENERAL HEALTH PANEL: CPT | Performed by: NURSE PRACTITIONER

## 2022-02-16 PROCEDURE — 84479 ASSAY OF THYROID (T3 OR T4): CPT | Performed by: NURSE PRACTITIONER

## 2022-02-16 PROCEDURE — 80061 LIPID PANEL: CPT | Performed by: NURSE PRACTITIONER

## 2022-03-03 ENCOUNTER — OFFICE VISIT (OUTPATIENT)
Dept: FAMILY MEDICINE CLINIC | Facility: CLINIC | Age: 57
End: 2022-03-03

## 2022-03-03 VITALS
BODY MASS INDEX: 35.34 KG/M2 | OXYGEN SATURATION: 95 % | WEIGHT: 238.6 LBS | SYSTOLIC BLOOD PRESSURE: 142 MMHG | HEIGHT: 69 IN | HEART RATE: 77 BPM | TEMPERATURE: 98 F | DIASTOLIC BLOOD PRESSURE: 76 MMHG

## 2022-03-03 DIAGNOSIS — I10 ESSENTIAL HYPERTENSION: ICD-10-CM

## 2022-03-03 DIAGNOSIS — Z09 FOLLOW-UP EXAM: Primary | ICD-10-CM

## 2022-03-03 PROCEDURE — 99213 OFFICE O/P EST LOW 20 MIN: CPT | Performed by: NURSE PRACTITIONER

## 2022-03-03 RX ORDER — METOPROLOL TARTRATE 50 MG/1
50 TABLET, FILM COATED ORAL 2 TIMES DAILY
Qty: 180 TABLET | Refills: 0 | Status: SHIPPED | OUTPATIENT
Start: 2022-03-03 | End: 2022-05-31 | Stop reason: SDUPTHER

## 2022-03-03 NOTE — PROGRESS NOTES
"Chief Complaint  Hypertension    Subjective          Tre Lau presents to [facility]  History of Present Illness   56-year-old male presenting for follow-up on blood pressure.with hypertension, during last visit I increased his Avapro to 150 mg, checks BP at home with average still ranging 140-170's/80's, will try increasing metoprolol to 50 mg BID, patient to continue monitoring once or twice daily, record and follow-up in 4 weeks. Denies HA, CP, SOA, dizziness or LE Edema.     Objective   Vital Signs:   /76 (BP Location: Left arm, Patient Position: Sitting, Cuff Size: Large Adult)   Pulse 77   Temp 98 °F (36.7 °C) (Infrared)   Ht 175.3 cm (69\")   Wt 108 kg (238 lb 9.6 oz)   SpO2 95%   BMI 35.24 kg/m²     Physical Exam  Cardiovascular:      Rate and Rhythm: Normal rate and regular rhythm.      Pulses: Normal pulses.      Heart sounds: Normal heart sounds.   Pulmonary:      Effort: Pulmonary effort is normal.      Breath sounds: Normal breath sounds.   Neurological:      Mental Status: He is alert and oriented to person, place, and time.        Result Review :                 Assessment and Plan    Diagnoses and all orders for this visit:    1. Follow-up exam (Primary)  -     metoprolol tartrate (Lopressor) 50 MG tablet; Take 1 tablet by mouth 2 (Two) Times a Day.  Dispense: 180 tablet; Refill: 0    2. Essential hypertension  -     metoprolol tartrate (Lopressor) 50 MG tablet; Take 1 tablet by mouth 2 (Two) Times a Day.  Dispense: 180 tablet; Refill: 0        Follow Up   Return in about 4 weeks (around 3/31/2022) for Recheck.  Patient was given instructions and counseling regarding his condition or for health maintenance advice. Please see specific information pulled into the AVS if appropriate.       "

## 2022-03-03 NOTE — PATIENT INSTRUCTIONS
"Hypertension, Adult  High blood pressure (hypertension) is when the force of blood pumping through the arteries is too strong. The arteries are the blood vessels that carry blood from the heart throughout the body. Hypertension forces the heart to work harder to pump blood and may cause arteries to become narrow or stiff. Untreated or uncontrolled hypertension can cause a heart attack, heart failure, a stroke, kidney disease, and other problems.  A blood pressure reading consists of a higher number over a lower number. Ideally, your blood pressure should be below 120/80. The first (\"top\") number is called the systolic pressure. It is a measure of the pressure in your arteries as your heart beats. The second (\"bottom\") number is called the diastolic pressure. It is a measure of the pressure in your arteries as the heart relaxes.  What are the causes?  The exact cause of this condition is not known. There are some conditions that result in or are related to high blood pressure.  What increases the risk?  Some risk factors for high blood pressure are under your control. The following factors may make you more likely to develop this condition:  · Smoking.  · Having type 2 diabetes mellitus, high cholesterol, or both.  · Not getting enough exercise or physical activity.  · Being overweight.  · Having too much fat, sugar, calories, or salt (sodium) in your diet.  · Drinking too much alcohol.  Some risk factors for high blood pressure may be difficult or impossible to change. Some of these factors include:  · Having chronic kidney disease.  · Having a family history of high blood pressure.  · Age. Risk increases with age.  · Race. You may be at higher risk if you are .  · Gender. Men are at higher risk than women before age 45. After age 65, women are at higher risk than men.  · Having obstructive sleep apnea.  · Stress.  What are the signs or symptoms?  High blood pressure may not cause symptoms. Very high " blood pressure (hypertensive crisis) may cause:  · Headache.  · Anxiety.  · Shortness of breath.  · Nosebleed.  · Nausea and vomiting.  · Vision changes.  · Severe chest pain.  · Seizures.  How is this diagnosed?  This condition is diagnosed by measuring your blood pressure while you are seated, with your arm resting on a flat surface, your legs uncrossed, and your feet flat on the floor. The cuff of the blood pressure monitor will be placed directly against the skin of your upper arm at the level of your heart. It should be measured at least twice using the same arm. Certain conditions can cause a difference in blood pressure between your right and left arms.  Certain factors can cause blood pressure readings to be lower or higher than normal for a short period of time:  · When your blood pressure is higher when you are in a health care provider's office than when you are at home, this is called white coat hypertension. Most people with this condition do not need medicines.  · When your blood pressure is higher at home than when you are in a health care provider's office, this is called masked hypertension. Most people with this condition may need medicines to control blood pressure.  If you have a high blood pressure reading during one visit or you have normal blood pressure with other risk factors, you may be asked to:  · Return on a different day to have your blood pressure checked again.  · Monitor your blood pressure at home for 1 week or longer.  If you are diagnosed with hypertension, you may have other blood or imaging tests to help your health care provider understand your overall risk for other conditions.  How is this treated?  This condition is treated by making healthy lifestyle changes, such as eating healthy foods, exercising more, and reducing your alcohol intake. Your health care provider may prescribe medicine if lifestyle changes are not enough to get your blood pressure under control, and  if:  · Your systolic blood pressure is above 130.  · Your diastolic blood pressure is above 80.  Your personal target blood pressure may vary depending on your medical conditions, your age, and other factors.  Follow these instructions at home:  Eating and drinking    · Eat a diet that is high in fiber and potassium, and low in sodium, added sugar, and fat. An example eating plan is called the DASH (Dietary Approaches to Stop Hypertension) diet. To eat this way:  ? Eat plenty of fresh fruits and vegetables. Try to fill one half of your plate at each meal with fruits and vegetables.  ? Eat whole grains, such as whole-wheat pasta, brown rice, or whole-grain bread. Fill about one fourth of your plate with whole grains.  ? Eat or drink low-fat dairy products, such as skim milk or low-fat yogurt.  ? Avoid fatty cuts of meat, processed or cured meats, and poultry with skin. Fill about one fourth of your plate with lean proteins, such as fish, chicken without skin, beans, eggs, or tofu.  ? Avoid pre-made and processed foods. These tend to be higher in sodium, added sugar, and fat.  · Reduce your daily sodium intake. Most people with hypertension should eat less than 1,500 mg of sodium a day.  · Do not drink alcohol if:  ? Your health care provider tells you not to drink.  ? You are pregnant, may be pregnant, or are planning to become pregnant.  · If you drink alcohol:  ? Limit how much you use to:  § 0-1 drink a day for women.  § 0-2 drinks a day for men.  ? Be aware of how much alcohol is in your drink. In the [place] one drink equals one 12 oz bottle of beer (355 mL), one 5 oz glass of wine (148 mL), or one 1½ oz glass of hard liquor (44 mL).    Lifestyle    · Work with your health care provider to maintain a healthy body weight or to lose weight. Ask what an ideal weight is for you.  · Get at least 30 minutes of exercise most days of the week. Activities may include walking, swimming, or biking.  · Include exercise to  strengthen your muscles (resistance exercise), such as Pilates or lifting weights, as part of your weekly exercise routine. Try to do these types of exercises for 30 minutes at least 3 days a week.  · Do not use any products that contain nicotine or tobacco, such as cigarettes, e-cigarettes, and chewing tobacco. If you need help quitting, ask your health care provider.  · Monitor your blood pressure at home as told by your health care provider.  · Keep all follow-up visits as told by your health care provider. This is important.    Medicines  · Take over-the-counter and prescription medicines only as told by your health care provider. Follow directions carefully. Blood pressure medicines must be taken as prescribed.  · Do not skip doses of blood pressure medicine. Doing this puts you at risk for problems and can make the medicine less effective.  · Ask your health care provider about side effects or reactions to medicines that you should watch for.  Contact a health care provider if you:  · Think you are having a reaction to a medicine you are taking.  · Have headaches that keep coming back (recurring).  · Feel dizzy.  · Have swelling in your ankles.  · Have trouble with your vision.  Get help right away if you:  · Develop a severe headache or confusion.  · Have unusual weakness or numbness.  · Feel faint.  · Have severe pain in your chest or abdomen.  · Vomit repeatedly.  · Have trouble breathing.  Summary  · Hypertension is when the force of blood pumping through your arteries is too strong. If this condition is not controlled, it may put you at risk for serious complications.  · Your personal target blood pressure may vary depending on your medical conditions, your age, and other factors. For most people, a normal blood pressure is less than 120/80.  · Hypertension is treated with lifestyle changes, medicines, or a combination of both. Lifestyle changes include losing weight, eating a healthy, low-sodium diet,  "exercising more, and limiting alcohol.  This information is not intended to replace advice given to you by your health care provider. Make sure you discuss any questions you have with your health care provider.  Document Revised: 08/28/2019 Document Reviewed: 08/28/2019  [employer] Patient Education © 2021 [employer]  https://www.nhlbi.nih.gov/files/docs/public/heart/dash_brief.pdf\">   DASH Eating Plan  DASH stands for Dietary Approaches to Stop Hypertension. The DASH eating plan is a healthy eating plan that has been shown to:  · Reduce high blood pressure (hypertension).  · Reduce your risk for type 2 diabetes, heart disease, and stroke.  · Help with weight loss.  What are tips for following this plan?  Reading food labels  · Check food labels for the amount of salt (sodium) per serving. Choose foods with less than 5 percent of the Daily Value of sodium. Generally, foods with less than 300 milligrams (mg) of sodium per serving fit into this eating plan.  · To find whole grains, look for the word \"whole\" as the first word in the ingredient list.  Shopping  · Buy products labeled as \"low-sodium\" or \"no salt added.\"  · Buy fresh foods. Avoid canned foods and pre-made or frozen meals.  Cooking  · Avoid adding salt when cooking. Use salt-free seasonings or herbs instead of table salt or sea salt. Check with your health care provider or pharmacist before using salt substitutes.  · Do not garcia foods. Cook foods using healthy methods such as baking, boiling, grilling, roasting, and broiling instead.  · Cook with heart-healthy oils, such as olive, canola, avocado, soybean, or sunflower oil.  Meal planning    · Eat a balanced diet that includes:  ? 4 or more servings of fruits and 4 or more servings of vegetables each day. Try to fill one-half of your plate with fruits and vegetables.  ? 6-8 servings of whole grains each day.  ? Less than 6 oz (170 g) of lean meat, poultry, or fish each day. A 3-oz (85-g) serving of meat is " about the same size as a deck of cards. One egg equals 1 oz (28 g).  ? 2-3 servings of low-fat dairy each day. One serving is 1 cup (237 mL).  ? 1 serving of nuts, seeds, or beans 5 times each week.  ? 2-3 servings of heart-healthy fats. Healthy fats called omega-3 fatty acids are found in foods such as walnuts, flaxseeds, fortified milks, and eggs. These fats are also found in cold-water fish, such as sardines, salmon, and mackerel.  · Limit how much you eat of:  ? Canned or prepackaged foods.  ? Food that is high in trans fat, such as some fried foods.  ? Food that is high in saturated fat, such as fatty meat.  ? Desserts and other sweets, sugary drinks, and other foods with added sugar.  ? Full-fat dairy products.  · Do not salt foods before eating.  · Do not eat more than 4 egg yolks a week.  · Try to eat at least 2 vegetarian meals a week.  · Eat more home-cooked food and less restaurant, buffet, and fast food.    Lifestyle  · When eating at a restaurant, ask that your food be prepared with less salt or no salt, if possible.  · If you drink alcohol:  ? Limit how much you use to:  § 0-1 drink a day for women who are not pregnant.  § 0-2 drinks a day for men.  ? Be aware of how much alcohol is in your drink. In the [place] one drink equals one 12 oz bottle of beer (355 mL), one 5 oz glass of wine (148 mL), or one 1½ oz glass of hard liquor (44 mL).  General information  · Avoid eating more than 2,300 mg of salt a day. If you have hypertension, you may need to reduce your sodium intake to 1,500 mg a day.  · Work with your health care provider to maintain a healthy body weight or to lose weight. Ask what an ideal weight is for you.  · Get at least 30 minutes of exercise that causes your heart to beat faster (aerobic exercise) most days of the week. Activities may include walking, swimming, or biking.  · Work with your health care provider or dietitian to adjust your eating plan to your individual calorie needs.  What  foods should I eat?  Fruits  All fresh, dried, or frozen fruit. Canned fruit in natural juice (without added sugar).  Vegetables  Fresh or frozen vegetables (raw, steamed, roasted, or grilled). Low-sodium or reduced-sodium tomato and vegetable juice. Low-sodium or reduced-sodium tomato sauce and tomato paste. Low-sodium or reduced-sodium canned vegetables.  Grains  Whole-grain or whole-wheat bread. Whole-grain or whole-wheat pasta. Brown rice. Oatmeal. Quinoa. Bulgur. Whole-grain and low-sodium cereals. Caity bread. Low-fat, low-sodium crackers. Whole-wheat flour tortillas.  Meats and other proteins  Skinless chicken or turkey. Ground chicken or turkey. Pork with fat trimmed off. Fish and seafood. Egg whites. Dried beans, peas, or lentils. Unsalted nuts, nut butters, and seeds. Unsalted canned beans. Lean cuts of beef with fat trimmed off. Low-sodium, lean precooked or cured meat, such as sausages or meat loaves.  Dairy  Low-fat (1%) or fat-free (skim) milk. Reduced-fat, low-fat, or fat-free cheeses. Nonfat, low-sodium ricotta or cottage cheese. Low-fat or nonfat yogurt. Low-fat, low-sodium cheese.  Fats and oils  Soft margarine without trans fats. Vegetable oil. Reduced-fat, low-fat, or light mayonnaise and salad dressings (reduced-sodium). Canola, safflower, olive, avocado, soybean, and sunflower oils. Avocado.  Seasonings and condiments  Herbs. Spices. Seasoning mixes without salt.  Other foods  Unsalted popcorn and pretzels. Fat-free sweets.  The items listed above may not be a complete list of foods and beverages you can eat. Contact a dietitian for more information.  What foods should I avoid?  Fruits  Canned fruit in a light or heavy syrup. Fried fruit. Fruit in cream or butter sauce.  Vegetables  Creamed or fried vegetables. Vegetables in a cheese sauce. Regular canned vegetables (not low-sodium or reduced-sodium). Regular canned tomato sauce and paste (not low-sodium or reduced-sodium). Regular tomato and  vegetable juice (not low-sodium or reduced-sodium). Pickles. Olives.  Grains  Baked goods made with fat, such as croissants, muffins, or some breads. Dry pasta or rice meal packs.  Meats and other proteins  Fatty cuts of meat. Ribs. Fried meat. [place]. Bologna, salami, and other precooked or cured meats, such as sausages or meat loaves. Fat from the back of a pig (fatback). Bratwurst. Salted nuts and seeds. Canned beans with added salt. Canned or smoked fish. Whole eggs or egg yolks. Chicken or turkey with skin.  Dairy  Whole or 2% milk, cream, and half-and-half. Whole or full-fat cream cheese. Whole-fat or sweetened yogurt. Full-fat cheese. Nondairy creamers. Whipped toppings. Processed cheese and cheese spreads.  Fats and oils  Butter. Stick margarine. Lard. Shortening. Ghee. [place] fat. Tropical oils, such as coconut, palm kernel, or palm oil.  Seasonings and condiments  Onion salt, garlic salt, seasoned salt, table salt, and sea salt. Worcestershire sauce. Tartar sauce. Barbecue sauce. Teriyaki sauce. Soy sauce, including reduced-sodium. Steak sauce. Canned and packaged gravies. Fish sauce. Oyster sauce. Cocktail sauce. Store-bought horseradish. Ketchup. Mustard. Meat flavorings and tenderizers. Bouillon cubes. Hot sauces. Pre-made or packaged marinades. Pre-made or packaged taco seasonings. Relishes. Regular salad dressings.  Other foods  Salted popcorn and pretzels.  The items listed above may not be a complete list of foods and beverages you should avoid. Contact a dietitian for more information.  Where to find more information  · National Heart, Lung, and Blood [place]: www.nhlbi.nih.gov  · American Heart Association: www.heart.org  · Academy of Nutrition and Dietetics: www.eatright.org  · National Kidney Foundation: www.kidney.org  Summary  · The DASH eating plan is a healthy eating plan that has been shown to reduce high blood pressure (hypertension). It may also reduce your risk for type 2 diabetes, heart  disease, and stroke.  · When on the DASH eating plan, aim to eat more fresh fruits and vegetables, whole grains, lean proteins, low-fat dairy, and heart-healthy fats.  · With the DASH eating plan, you should limit salt (sodium) intake to 2,300 mg a day. If you have hypertension, you may need to reduce your sodium intake to 1,500 mg a day.  · Work with your health care provider or dietitian to adjust your eating plan to your individual calorie needs.  This information is not intended to replace advice given to you by your health care provider. Make sure you discuss any questions you have with your health care provider.  Document Revised: 11/20/2020 Document Reviewed: 11/20/2020  Elsevier Patient Education © 2021 [employer]

## 2022-03-31 ENCOUNTER — OFFICE VISIT (OUTPATIENT)
Dept: FAMILY MEDICINE CLINIC | Facility: CLINIC | Age: 57
End: 2022-03-31

## 2022-03-31 VITALS
TEMPERATURE: 98 F | HEIGHT: 69 IN | HEART RATE: 68 BPM | WEIGHT: 242.6 LBS | BODY MASS INDEX: 35.93 KG/M2 | SYSTOLIC BLOOD PRESSURE: 138 MMHG | DIASTOLIC BLOOD PRESSURE: 84 MMHG | OXYGEN SATURATION: 98 %

## 2022-03-31 DIAGNOSIS — Z09 FOLLOW-UP EXAM: ICD-10-CM

## 2022-03-31 DIAGNOSIS — I10 ESSENTIAL HYPERTENSION: Primary | ICD-10-CM

## 2022-03-31 PROCEDURE — 99213 OFFICE O/P EST LOW 20 MIN: CPT | Performed by: NURSE PRACTITIONER

## 2022-03-31 RX ORDER — IRBESARTAN 150 MG/1
150 TABLET ORAL NIGHTLY
Qty: 90 TABLET | Refills: 2 | Status: SHIPPED | OUTPATIENT
Start: 2022-05-02 | End: 2022-05-25

## 2022-03-31 NOTE — PROGRESS NOTES
"Chief Complaint  Hypertension (Adjusting b/p)    Subjective          Tre Lau presents to [facility]  History of Present Illness   56-year-old male presenting for follow-up hypertension, during last visit I increased his metoprolol to 50 mg twice daily and Avapro to 150 mg, checks BP daily with average 130's/80's.  His blood pressure is better controlled with increased dose, he was educated to continue monitoring blood pressure daily and notify if consistent elevation of 140s/90's or greater, denies SOA, CP, dizziness, HA, LE edema.     Objective   Vital Signs:   /84 (BP Location: Right arm, Patient Position: Sitting, Cuff Size: Adult)   Pulse 68   Temp 98 °F (36.7 °C)   Ht 175.3 cm (69\")   Wt 110 kg (242 lb 9.6 oz)   SpO2 98%   BMI 35.83 kg/m²     BMI is above normal parameters. Recommendations: educational material discussed/shared in after visit summary       Physical Exam  Cardiovascular:      Rate and Rhythm: Normal rate and regular rhythm.      Pulses: Normal pulses.      Heart sounds: Normal heart sounds.   Pulmonary:      Effort: Pulmonary effort is normal.      Breath sounds: Normal breath sounds.   Neurological:      Mental Status: He is alert and oriented to person, place, and time.   Psychiatric:         Mood and Affect: Mood normal.         Behavior: Behavior normal.        Result Review :                 Assessment and Plan    Diagnoses and all orders for this visit:    1. Essential hypertension (Primary)  -     irbesartan (Avapro) 150 MG tablet; Take 1 tablet by mouth Every Night.  Dispense: 90 tablet; Refill: 2    2. Follow-up exam        Follow Up   Return in about 10 months (around 1/31/2023) for Annual physical.  Patient was given instructions and counseling regarding his condition or for health maintenance advice. Please see specific information pulled into the AVS if appropriate.       "

## 2022-04-11 RX ORDER — ATORVASTATIN CALCIUM 20 MG/1
20 TABLET, FILM COATED ORAL DAILY
Qty: 90 TABLET | Refills: 2 | Status: SHIPPED | OUTPATIENT
Start: 2022-04-11 | End: 2023-01-05

## 2022-05-25 ENCOUNTER — OFFICE VISIT (OUTPATIENT)
Dept: FAMILY MEDICINE CLINIC | Facility: CLINIC | Age: 57
End: 2022-05-25

## 2022-05-25 VITALS
HEART RATE: 67 BPM | HEIGHT: 69 IN | TEMPERATURE: 97.3 F | WEIGHT: 239.8 LBS | BODY MASS INDEX: 35.52 KG/M2 | DIASTOLIC BLOOD PRESSURE: 80 MMHG | OXYGEN SATURATION: 99 % | SYSTOLIC BLOOD PRESSURE: 152 MMHG

## 2022-05-25 DIAGNOSIS — E66.9 OBESITY (BMI 30-39.9): ICD-10-CM

## 2022-05-25 DIAGNOSIS — I10 UNCONTROLLED HYPERTENSION: Primary | ICD-10-CM

## 2022-05-25 PROCEDURE — 99213 OFFICE O/P EST LOW 20 MIN: CPT | Performed by: NURSE PRACTITIONER

## 2022-05-25 RX ORDER — IRBESARTAN 300 MG/1
300 TABLET ORAL NIGHTLY
Qty: 90 TABLET | Refills: 1 | Status: SHIPPED | OUTPATIENT
Start: 2022-05-25 | End: 2022-11-15 | Stop reason: SDUPTHER

## 2022-05-25 NOTE — PROGRESS NOTES
"Chief Complaint  Hypertension    Subjective          Tre Lau presents to [facility]  History of Present Illness   56-year-old male presenting for follow-up hypertension, during last visit I increased his metoprolol from 25 mg to 50 mg twice daily, also takes Avapro 150 mg, checks BP at home with average 140-150's/80-90's, today 's/80's manually bilateral arm, will increase Avapro to 300 mg nightly, denies CP, SOA, HA, dizziness, LE edema.     Objective   Vital Signs:  /80 (BP Location: Right arm, Patient Position: Sitting, Cuff Size: Adult)   Pulse 67   Temp 97.3 °F (36.3 °C) (Temporal)   Ht 175.3 cm (69.02\")   Wt 109 kg (239 lb 12.8 oz)   SpO2 99%   BMI 35.40 kg/m²   Class 2 Severe Obesity (BMI >=35 and <=39.9). Obesity-related health conditions include the following: hypertension and dyslipidemias. Obesity is unchanged. BMI is is above average; BMI management plan is completed. We discussed portion control and increasing exercise.      Physical Exam  Cardiovascular:      Rate and Rhythm: Normal rate.      Pulses: Normal pulses.      Heart sounds: Normal heart sounds.   Pulmonary:      Effort: Pulmonary effort is normal.      Breath sounds: Normal breath sounds.   Neurological:      General: No focal deficit present.      Mental Status: He is alert and oriented to person, place, and time.   Psychiatric:         Mood and Affect: Mood normal.         Behavior: Behavior normal.         Thought Content: Thought content normal.         Judgment: Judgment normal.        Result Review :                 Assessment and Plan    Diagnoses and all orders for this visit:    1. Uncontrolled hypertension (Primary)  -     irbesartan (Avapro) 300 MG tablet; Take 1 tablet by mouth Every Night.  Dispense: 90 tablet; Refill: 1    2. Obesity (BMI 30-39.9)             Follow Up   Return if symptoms worsen or fail to improve.  Patient was given instructions and counseling regarding his " condition or for health maintenance advice. Please see specific information pulled into the AVS if appropriate.

## 2022-05-31 DIAGNOSIS — Z09 FOLLOW-UP EXAM: ICD-10-CM

## 2022-05-31 DIAGNOSIS — I10 ESSENTIAL HYPERTENSION: ICD-10-CM

## 2022-05-31 RX ORDER — METOPROLOL TARTRATE 50 MG/1
50 TABLET, FILM COATED ORAL 2 TIMES DAILY
Qty: 180 TABLET | Refills: 3 | Status: SHIPPED | OUTPATIENT
Start: 2022-05-31 | End: 2023-02-28 | Stop reason: SDUPTHER

## 2022-06-27 ENCOUNTER — TELEPHONE (OUTPATIENT)
Dept: CARDIOLOGY | Facility: CLINIC | Age: 57
End: 2022-06-27

## 2022-06-27 NOTE — TELEPHONE ENCOUNTER
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  His LOV was in Dec 2021 and does not need to follow up for one year.     Martina, there is nothing mentioned in the LOV note that he is cleared for DOT standpoint.  Do you want to attend the note or write a letter for me to attach with the EKG and LOV note?      Please advise. / DIEGO     Patient can be reached at [phone]

## 2022-06-27 NOTE — TELEPHONE ENCOUNTER
Patient notified requested correspondence will be left at the  for .  He verbalized understanding./ DIEGO

## 2022-11-15 DIAGNOSIS — I10 UNCONTROLLED HYPERTENSION: ICD-10-CM

## 2022-11-15 RX ORDER — IRBESARTAN 300 MG/1
300 TABLET ORAL NIGHTLY
Qty: 90 TABLET | Refills: 1 | Status: SHIPPED | OUTPATIENT
Start: 2022-11-15 | End: 2022-12-27

## 2022-12-27 ENCOUNTER — OFFICE VISIT (OUTPATIENT)
Dept: CARDIOLOGY | Facility: CLINIC | Age: 57
End: 2022-12-27

## 2022-12-27 VITALS
WEIGHT: 244 LBS | BODY MASS INDEX: 36.14 KG/M2 | HEART RATE: 56 BPM | DIASTOLIC BLOOD PRESSURE: 90 MMHG | HEIGHT: 69 IN | SYSTOLIC BLOOD PRESSURE: 170 MMHG

## 2022-12-27 DIAGNOSIS — I48.0 PAROXYSMAL ATRIAL FIBRILLATION: Primary | ICD-10-CM

## 2022-12-27 DIAGNOSIS — I10 ESSENTIAL HYPERTENSION: ICD-10-CM

## 2022-12-27 DIAGNOSIS — E78.2 MIXED HYPERLIPIDEMIA: ICD-10-CM

## 2022-12-27 DIAGNOSIS — I10 UNCONTROLLED HYPERTENSION: ICD-10-CM

## 2022-12-27 PROCEDURE — 99214 OFFICE O/P EST MOD 30 MIN: CPT | Performed by: NURSE PRACTITIONER

## 2022-12-27 PROCEDURE — 93000 ELECTROCARDIOGRAM COMPLETE: CPT | Performed by: NURSE PRACTITIONER

## 2022-12-27 RX ORDER — IRBESARTAN 150 MG/1
150 TABLET ORAL NIGHTLY
Start: 2022-12-27 | End: 2022-12-29

## 2022-12-27 NOTE — PROGRESS NOTES
"Date of Office Visit: 22  Encounter Provider: RYAN Solano  Place of Service: [facility] CARDIOLOGY  Patient Name: Tre Lau  :1965    Chief Complaint   Patient presents with   • Atrial Fibrillation   :     HPI: Tre Lau is a 57 y.o. male  with hypertension, hyperlipidemia, and paroxysmal atrial fibrillation.He is followed by Dr. Mann and I will visit with him in follow up today.      In 2015 he presented with tachycardia, shortness of breath, atypical chest pain, and dizziness.  He was in rapid atrial fibrillation and converted back to sinus rhythm spontaneously.  He had a stress echocardiogram that was normal.  His TSH was negative.  He was started on metoprolol and aspirin.    He presents today for reassessment.  He reports his metoprolol was increased a few months ago.  He has been checking his blood pressure at home with admission and his blood pressure readings are 130/70-80.  He denies chest pain, shortness of breath, palpitations, edema.  He is wearing a mouthpiece for history of sleep apnea.  He is not exercising currently but plans to get back.  He no longer smokes.  He has no blood in the urine or stool.          No Known Allergies        Family and social history reviewed.     ROS  All other systems were reviewed and are negative          Objective:     Vitals:    22 1002   BP: 170/90   BP Location: Left arm   Pulse: 56   Weight: 111 kg (244 lb)   Height: 175.3 cm (69\")     Body mass index is 36.03 kg/m².    PHYSICAL EXAM:  Pulmonary:      Effort: Pulmonary effort is normal.      Breath sounds: Normal breath sounds.   Cardiovascular:      Normal rate. Regular rhythm.           ECG 12 Lead    Date/Time: 2022 10:26 AM  Performed by: Toña Juares APRN  Authorized by: Toña Juares APRN   Comparison: compared with previous ECG   Similar to previous ECG  Rhythm: sinus rhythm  Rate: normal  QRS axis: normal    Clinical " impression: normal ECG              Current Outpatient Medications   Medication Sig Dispense Refill   • aspirin 81 MG tablet Take 1 tablet by mouth daily.     • atorvastatin (LIPITOR) 20 MG tablet TAKE 1 TABLET BY MOUTH DAILY 90 tablet 2   • irbesartan (Avapro) 150 MG tablet Take 1 tablet by mouth Every Night.     • metoprolol tartrate (Lopressor) 50 MG tablet Take 1 tablet by mouth 2 (Two) Times a Day. 180 tablet 3     No current facility-administered medications for this visit.     Assessment:       Diagnosis Plan   1. Paroxysmal atrial fibrillation (HCC)        2. Uncontrolled hypertension  irbesartan (Avapro) 150 MG tablet      3. Mixed hyperlipidemia             Orders Placed This Encounter   Procedures   • ECG 12 Lead     This order was created via procedure documentation     Order Specific Question:   Release to patient     Answer:   Routine Release         Plan:       1. 56-year-old man with paroxysmal atrial fibrillation. CHADS2-VASc score is 1 He was diagnosed in February of 2015 and spontaneously converted to normal sinus rhythm.  He is maintained on Lopressor 50 mg twice daily and aspirin 81 mg.  Continue the same  2. Hypertension reported to be controlled at home on current regimen of Avapro 150 mg daily at night and metoprolol tartrate 50 mg twice daily 3. Hyperlipidemia on Lipitor 20 mg  4.  Obesity-BMI 36.03 He is staying active but needs to start back exercising  5.  Nose injury-chronic and unchanged  6.  Obstructive sleep apnea.  He reports compliance with mouthpiece therapy  7.  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      Follow-up in 6 months to meet Dr. Leonel Roberson.           It has been a pleasure to participate in this patient's care.      Thank you,  RYAN Solano      **I used Dragon to dictate this note:**

## 2022-12-29 ENCOUNTER — TELEPHONE (OUTPATIENT)
Dept: CARDIOLOGY | Facility: CLINIC | Age: 57
End: 2022-12-29

## 2022-12-29 DIAGNOSIS — I10 UNCONTROLLED HYPERTENSION: ICD-10-CM

## 2022-12-29 RX ORDER — IRBESARTAN 300 MG/1
300 TABLET ORAL NIGHTLY
Start: 2022-12-29 | End: 2023-02-28

## 2022-12-29 NOTE — TELEPHONE ENCOUNTER
Pt called to report he is taking Irbesartan 300mg daily NOT 150mg daily as he originally said at his last office visit with you.

## 2023-01-05 RX ORDER — ATORVASTATIN CALCIUM 20 MG/1
20 TABLET, FILM COATED ORAL DAILY
Qty: 90 TABLET | Refills: 2 | Status: SHIPPED | OUTPATIENT
Start: 2023-01-05 | End: 2023-02-28 | Stop reason: SDUPTHER

## 2023-02-07 ENCOUNTER — OFFICE VISIT (OUTPATIENT)
Dept: FAMILY MEDICINE CLINIC | Facility: CLINIC | Age: 58
End: 2023-02-07
Payer: COMMERCIAL

## 2023-02-07 VITALS
HEIGHT: 69 IN | TEMPERATURE: 97.8 F | WEIGHT: 243 LBS | HEART RATE: 68 BPM | DIASTOLIC BLOOD PRESSURE: 92 MMHG | BODY MASS INDEX: 35.99 KG/M2 | RESPIRATION RATE: 18 BRPM | SYSTOLIC BLOOD PRESSURE: 170 MMHG | OXYGEN SATURATION: 97 %

## 2023-02-07 DIAGNOSIS — T63.301A SPIDER BITE WOUND, ACCIDENTAL OR UNINTENTIONAL, INITIAL ENCOUNTER: ICD-10-CM

## 2023-02-07 DIAGNOSIS — S01.20XA OPEN WOUND OF NOSE, UNSPECIFIED OPEN WOUND TYPE, INITIAL ENCOUNTER: ICD-10-CM

## 2023-02-07 DIAGNOSIS — I10 ESSENTIAL HYPERTENSION: Primary | ICD-10-CM

## 2023-02-07 PROBLEM — E78.2 MIXED HYPERLIPIDEMIA: Status: ACTIVE | Noted: 2023-02-07

## 2023-02-07 LAB
ALBUMIN SERPL-MCNC: 4.6 G/DL (ref 3.5–5.2)
ALBUMIN/GLOB SERPL: 1.8 G/DL
ALP SERPL-CCNC: 88 U/L (ref 39–117)
ALT SERPL-CCNC: 18 U/L (ref 1–41)
AST SERPL-CCNC: 17 U/L (ref 1–40)
BASOPHILS # BLD AUTO: 0.09 10*3/MM3 (ref 0–0.2)
BASOPHILS NFR BLD AUTO: 0.7 % (ref 0–1.5)
BILIRUB SERPL-MCNC: 0.6 MG/DL (ref 0–1.2)
BUN SERPL-MCNC: 12 MG/DL (ref 6–20)
BUN/CREAT SERPL: 12.5 (ref 7–25)
CALCIUM SERPL-MCNC: 9.4 MG/DL (ref 8.6–10.5)
CHLORIDE SERPL-SCNC: 102 MMOL/L (ref 98–107)
CO2 SERPL-SCNC: 26.6 MMOL/L (ref 22–29)
CREAT SERPL-MCNC: 0.96 MG/DL (ref 0.76–1.27)
EGFRCR SERPLBLD CKD-EPI 2021: 92.2 ML/MIN/1.73
EOSINOPHIL # BLD AUTO: 0.41 10*3/MM3 (ref 0–0.4)
EOSINOPHIL NFR BLD AUTO: 3.4 % (ref 0.3–6.2)
ERYTHROCYTE [DISTWIDTH] IN BLOOD BY AUTOMATED COUNT: 12.7 % (ref 12.3–15.4)
GLOBULIN SER CALC-MCNC: 2.6 GM/DL
GLUCOSE SERPL-MCNC: 96 MG/DL (ref 65–99)
HCT VFR BLD AUTO: 44.6 % (ref 37.5–51)
HGB BLD-MCNC: 15.4 G/DL (ref 13–17.7)
IMM GRANULOCYTES # BLD AUTO: 0.06 10*3/MM3 (ref 0–0.05)
IMM GRANULOCYTES NFR BLD AUTO: 0.5 % (ref 0–0.5)
LYMPHOCYTES # BLD AUTO: 1.82 10*3/MM3 (ref 0.7–3.1)
LYMPHOCYTES NFR BLD AUTO: 15.1 % (ref 19.6–45.3)
MCH RBC QN AUTO: 30.7 PG (ref 26.6–33)
MCHC RBC AUTO-ENTMCNC: 34.5 G/DL (ref 31.5–35.7)
MCV RBC AUTO: 88.8 FL (ref 79–97)
MONOCYTES # BLD AUTO: 0.98 10*3/MM3 (ref 0.1–0.9)
MONOCYTES NFR BLD AUTO: 8.1 % (ref 5–12)
NEUTROPHILS # BLD AUTO: 8.7 10*3/MM3 (ref 1.7–7)
NEUTROPHILS NFR BLD AUTO: 72.2 % (ref 42.7–76)
NRBC BLD AUTO-RTO: 0 /100 WBC (ref 0–0.2)
PLATELET # BLD AUTO: 231 10*3/MM3 (ref 140–450)
POTASSIUM SERPL-SCNC: 5 MMOL/L (ref 3.5–5.2)
PROT SERPL-MCNC: 7.2 G/DL (ref 6–8.5)
RBC # BLD AUTO: 5.02 10*6/MM3 (ref 4.14–5.8)
SODIUM SERPL-SCNC: 140 MMOL/L (ref 136–145)
WBC # BLD AUTO: 12.06 10*3/MM3 (ref 3.4–10.8)

## 2023-02-07 PROCEDURE — 99214 OFFICE O/P EST MOD 30 MIN: CPT

## 2023-02-07 RX ORDER — DOXYCYCLINE HYCLATE 100 MG/1
100 CAPSULE ORAL 2 TIMES DAILY
Qty: 20 CAPSULE | Refills: 0 | Status: SHIPPED | OUTPATIENT
Start: 2023-02-07 | End: 2023-02-17

## 2023-02-07 RX ORDER — CIPROFLOXACIN 500 MG/1
500 TABLET, FILM COATED ORAL 2 TIMES DAILY
Qty: 20 TABLET | Refills: 0 | Status: SHIPPED | OUTPATIENT
Start: 2023-02-07 | End: 2023-02-17

## 2023-02-07 NOTE — PROGRESS NOTES
"Chief Complaint  nose reinjured (Spider bite a few years ago, nose was hit with zipper and reopened.) and Hypertension (Been taking Irbesartan 300mg daily and Metoprolol 50 bid )    Subjective        Tre Lau presents to [facility]  History of Present Illness  Patient is a 57-year-old male, patient of Dr. Farris, new to me.  Patient here today with complaints of a recurrent nose injury.  Patient states he was bit by a spider a \"few years ago\", but never received treatment and has had continued drainage. Patent reports he bumped the left side of his nose with a zipper from his jacket last week and the wound reopened.  Patient has not had a CBC or CMP completed since 2/16/2022.  Patient denies running fever at home. (Please refer to objective data for picture of wound).  With hypertension, patient is prescribed metoprolol 50 mg tablet twice daily and irbesartan 300 mg tablet nightly.  Today's blood pressure 170/92.  Denies headache, visual changes, dizziness, shortness of air, chest pain, peripheral edema, hemiparesis, or slurred speech.    Objective   Vital Signs:  /92 (BP Location: Left arm, Patient Position: Sitting, Cuff Size: Large Adult)   Pulse 68   Temp 97.8 °F (36.6 °C) (Infrared)   Resp 18   Ht 175.3 cm (69\")   Wt 110 kg (243 lb)   SpO2 97%   BMI 35.88 kg/m²   Estimated body mass index is 35.88 kg/m² as calculated from the following:    Height as of this encounter: 175.3 cm (69\").    Weight as of this encounter: 110 kg (243 lb).                     Physical Exam  Constitutional:       General: He is awake.      Appearance: Normal appearance.   HENT:      Head: Normocephalic and atraumatic.      Nose: Nose normal.   Eyes:      Extraocular Movements: Extraocular movements intact.      Conjunctiva/sclera: Conjunctivae normal.      Pupils: Pupils are equal, round, and reactive to light.   Cardiovascular:      Rate and Rhythm: Normal rate and regular rhythm.      " Pulses: Normal pulses.      Heart sounds: Normal heart sounds.   Pulmonary:      Effort: Pulmonary effort is normal.      Breath sounds: Normal breath sounds and air entry.   Skin:     General: Skin is warm and dry.   Neurological:      General: No focal deficit present.      Mental Status: He is alert and oriented to person, place, and time. Mental status is at baseline.   Psychiatric:         Attention and Perception: Attention normal.         Behavior: Behavior normal. Behavior is cooperative.        Result Review :                   Assessment and Plan   Diagnoses and all orders for this visit:    1. Essential hypertension (Primary)  -     CBC & Differential  -     Comprehensive Metabolic Panel    2. Open wound of nose, unspecified open wound type, initial encounter  -     Ambulatory Referral to Plastic Surgery  -     Anaerobic & Aerobic Culture ([facility] Only) - Swab, Nasal turbinate, middle  -     Anaerobic & Aerobic Culture ([facility] Only) - Swab, Nose  -     Ambulatory Referral to Wound Clinic  -     doxycycline (VIBRAMYCIN) 100 MG capsule; Take 1 capsule by mouth 2 (Two) Times a Day for 10 days.  Dispense: 20 capsule; Refill: 0  -     ciprofloxacin (Cipro) 500 MG tablet; Take 1 tablet by mouth 2 (Two) Times a Day for 10 days.  Dispense: 20 tablet; Refill: 0    3. Spider bite wound, accidental or unintentional, initial encounter  -     Ambulatory Referral to Plastic Surgery  -     Anaerobic & Aerobic Culture ([facility] Only) - Swab, Nasal turbinate, middle  -     Anaerobic & Aerobic Culture ([facility] Only) - Swab, Nose  -     Ambulatory Referral to Wound Clinic  -     doxycycline (VIBRAMYCIN) 100 MG capsule; Take 1 capsule by mouth 2 (Two) Times a Day for 10 days.  Dispense: 20 capsule; Refill: 0  -     ciprofloxacin (Cipro) 500 MG tablet; Take 1 tablet by mouth 2 (Two) Times a Day for 10 days.  Dispense: 20 tablet; Refill: 0    I have referred you to wound care and plastic surgery.     Please start taking  doxycycline twice a day for 10 days and Cipro floxacillin twice a day for 10 days.  Take these with a daily probiotic.    Follow-up in my office in 2 weeks to recheck blood pressure and reassess wound.    Patient agrees with plan of care and understands instructions. Call if worsening symptoms or any problems or concerns.          Follow Up   Return in about 2 weeks (around 2/21/2023) for recheck wound and BP.  Patient was given instructions and counseling regarding his condition or for health maintenance advice. Please see specific information pulled into the AVS if appropriate.

## 2023-02-07 NOTE — PATIENT INSTRUCTIONS
I have referred you to wound care and plastic surgery.     Please start taking doxycycline twice a day for 10 days and Cipro floxacillin twice a day for 10 days.  Take these with a daily probiotic.    Follow-up in my office in 2 weeks to recheck blood pressure and reassess wound.    Patient agrees with plan of care and understands instructions. Call if worsening symptoms or any problems or concerns.

## 2023-02-08 ENCOUNTER — OFFICE VISIT (OUTPATIENT)
Dept: WOUND CARE | Facility: HOSPITAL | Age: 58
End: 2023-02-08
Payer: COMMERCIAL

## 2023-02-08 PROCEDURE — 97602 WOUND(S) CARE NON-SELECTIVE: CPT

## 2023-02-08 PROCEDURE — G0463 HOSPITAL OUTPT CLINIC VISIT: HCPCS

## 2023-02-09 LAB
BACTERIA SPEC RESP CULT: NORMAL
BACTERIA SPEC RESP CULT: NORMAL
Lab: NORMAL
SPECIMEN STATUS: NORMAL

## 2023-02-14 LAB
BACTERIA SPEC AEROBE CULT: ABNORMAL
BACTERIA SPEC ANAEROBE CULT: ABNORMAL
BACTERIA SPEC CULT: ABNORMAL

## 2023-02-14 RX ORDER — FLUCONAZOLE 100 MG/1
100 TABLET ORAL DAILY
Qty: 14 TABLET | Refills: 0 | Status: SHIPPED | OUTPATIENT
Start: 2023-02-14 | End: 2023-02-28

## 2023-02-21 ENCOUNTER — OFFICE VISIT (OUTPATIENT)
Dept: WOUND CARE | Facility: HOSPITAL | Age: 58
End: 2023-02-21
Payer: COMMERCIAL

## 2023-02-21 PROCEDURE — G0463 HOSPITAL OUTPT CLINIC VISIT: HCPCS

## 2023-02-28 ENCOUNTER — OFFICE VISIT (OUTPATIENT)
Dept: FAMILY MEDICINE CLINIC | Facility: CLINIC | Age: 58
End: 2023-02-28
Payer: COMMERCIAL

## 2023-02-28 VITALS
BODY MASS INDEX: 35.84 KG/M2 | RESPIRATION RATE: 20 BRPM | HEART RATE: 71 BPM | WEIGHT: 242 LBS | OXYGEN SATURATION: 98 % | DIASTOLIC BLOOD PRESSURE: 94 MMHG | HEIGHT: 69 IN | SYSTOLIC BLOOD PRESSURE: 170 MMHG | TEMPERATURE: 98 F

## 2023-02-28 DIAGNOSIS — I10 ESSENTIAL HYPERTENSION: ICD-10-CM

## 2023-02-28 DIAGNOSIS — S01.20XA OPEN WOUND OF NOSE, UNSPECIFIED OPEN WOUND TYPE, INITIAL ENCOUNTER: Primary | ICD-10-CM

## 2023-02-28 DIAGNOSIS — Z09 FOLLOW-UP EXAM: ICD-10-CM

## 2023-02-28 DIAGNOSIS — I10 UNCONTROLLED HYPERTENSION: ICD-10-CM

## 2023-02-28 DIAGNOSIS — B37.9 CANDIDA PARAPSILOSIS INFECTION: ICD-10-CM

## 2023-02-28 PROCEDURE — 99214 OFFICE O/P EST MOD 30 MIN: CPT

## 2023-02-28 RX ORDER — IRBESARTAN 300 MG/1
300 TABLET ORAL DAILY
Start: 2023-02-28 | End: 2023-03-10 | Stop reason: CLARIF

## 2023-02-28 RX ORDER — METOPROLOL TARTRATE 100 MG/1
100 TABLET ORAL 2 TIMES DAILY
Qty: 60 TABLET | Refills: 0 | Status: SHIPPED | OUTPATIENT
Start: 2023-02-28 | End: 2023-04-07 | Stop reason: SDUPTHER

## 2023-02-28 RX ORDER — ATORVASTATIN CALCIUM 20 MG/1
20 TABLET, FILM COATED ORAL NIGHTLY
Qty: 90 TABLET | Refills: 2
Start: 2023-02-28

## 2023-02-28 RX ORDER — METOPROLOL TARTRATE 100 MG/1
TABLET ORAL
Qty: 180 TABLET | OUTPATIENT
Start: 2023-02-28

## 2023-02-28 NOTE — PROGRESS NOTES
"Chief Complaint  Hypertension (Has been to specialist for surgery in [place] if b/p controlled has 1 Diflucan left to take)    Subjective        Tre Lau presents to [facility]  History of Present Illness  Patient is a 57-year-old male, patient of Dr. Farris.  Patient here today for 2-week follow-up for wound to nose.   Patient last saw me in office on 2/7/2023 and reports he was bit by a spider 2 years ago and has had residual effects from the bite to nose.  Patient was sent to wound and plastic surgery for further eval. Wound culture revealed fungal infection and was started on Diflucan 200 mg tablet once daily for 2 weeks.  Today, patient reports he has 1 pill left and is here to recheck liver enzymes.  Patient reports prior to surgery patient uses blood pressure under control. With hypertension, patient is prescribed metoprolol 50 mg tablet twice daily and irbesartan 300 mg tablet nightly.  Today, blood pressure 170/94. With home BP log, 153-187/. Denies headache, visual changes, dizziness, shortness of air, chest pain, peripheral edema, hemiparesis, or slurred speech.    Objective   Vital Signs:  /94 (BP Location: Left arm, Patient Position: Sitting, Cuff Size: Large Adult)   Pulse 71   Temp 98 °F (36.7 °C) (Infrared)   Resp 20   Ht 175.3 cm (69\")   Wt 110 kg (242 lb)   SpO2 98%   BMI 35.74 kg/m²   Estimated body mass index is 35.74 kg/m² as calculated from the following:    Height as of this encounter: 175.3 cm (69\").    Weight as of this encounter: 110 kg (242 lb).             Physical Exam  Constitutional:       General: He is awake.      Appearance: Normal appearance.   HENT:      Head: Normocephalic and atraumatic.      Nose: Nose normal.   Eyes:      Extraocular Movements: Extraocular movements intact.      Conjunctiva/sclera: Conjunctivae normal.      Pupils: Pupils are equal, round, and reactive to light.   Cardiovascular:      Rate and Rhythm: Normal " rate and regular rhythm.      Pulses: Normal pulses.      Heart sounds: Normal heart sounds.   Pulmonary:      Effort: Pulmonary effort is normal.      Breath sounds: Normal breath sounds and air entry.   Skin:     General: Skin is warm and dry.      Comments: Wound to nose.   Neurological:      General: No focal deficit present.      Mental Status: He is alert and oriented to person, place, and time. Mental status is at baseline.   Psychiatric:         Attention and Perception: Attention normal.         Behavior: Behavior normal. Behavior is cooperative.        Result Review :                   Assessment and Plan   Diagnoses and all orders for this visit:    1. Open wound of nose, unspecified open wound type, initial encounter (Primary)  -     CBC (No Diff)  -     Comprehensive Metabolic Panel  -     Anaerobic & Aerobic Culture ([facility] Only) - Swab, Nose    2. Candida parapsilosis infection  -     CBC (No Diff)  -     Comprehensive Metabolic Panel  -     Anaerobic & Aerobic Culture ([facility] Only) - Swab, Nose    3. Follow-up exam  -     metoprolol tartrate (Lopressor) 100 MG tablet; Take 1 tablet by mouth 2 (Two) Times a Day.  Dispense: 60 tablet; Refill: 0    4. Essential hypertension  -     metoprolol tartrate (Lopressor) 100 MG tablet; Take 1 tablet by mouth 2 (Two) Times a Day.  Dispense: 60 tablet; Refill: 0    5. Uncontrolled hypertension  -     irbesartan (AVAPRO) 300 MG tablet; Take 1 tablet by mouth Daily.    Other orders  -     atorvastatin (LIPITOR) 20 MG tablet; Take 1 tablet by mouth Every Night.  Dispense: 90 tablet; Refill: 2    Our office will call you or you will see a note on your Alektrona ailin when your in-office blood work results.    Take 100 mg metoprolol twice a day.  Take irbesartan 300 mg every morning with your daily aspirin and morning metoprolol.    Keep a log of blood pressures and return to office in 1 week.    Patient agrees with plan of care and understands instructions. Call if  worsening symptoms or any problems or concerns.          Follow Up   Return in about 1 week (around 3/7/2023), or if symptoms worsen or fail to improve, for Recheck BP.  Patient was given instructions and counseling regarding his condition or for health maintenance advice. Please see specific information pulled into the AVS if appropriate.

## 2023-02-28 NOTE — PATIENT INSTRUCTIONS
Our office will call you or you will see a note on your Turpitude ailin when your in-office blood work results.    Take 100 mg metoprolol twice a day.  Take irbesartan 300 mg every morning with your daily aspirin and morning metoprolol.    Keep a log of blood pressures and return to office in 1 week.    Patient agrees with plan of care and understands instructions. Call if worsening symptoms or any problems or concerns.

## 2023-03-06 LAB
BACTERIA SPEC AEROBE CULT: NORMAL
BACTERIA SPEC ANAEROBE CULT: NORMAL
BACTERIA SPEC CULT: NORMAL
BACTERIA SPEC CULT: NORMAL

## 2023-03-10 ENCOUNTER — OFFICE VISIT (OUTPATIENT)
Dept: FAMILY MEDICINE CLINIC | Facility: CLINIC | Age: 58
End: 2023-03-10
Payer: COMMERCIAL

## 2023-03-10 VITALS
HEIGHT: 69 IN | BODY MASS INDEX: 35.84 KG/M2 | RESPIRATION RATE: 18 BRPM | TEMPERATURE: 98.4 F | HEART RATE: 60 BPM | OXYGEN SATURATION: 97 % | SYSTOLIC BLOOD PRESSURE: 144 MMHG | DIASTOLIC BLOOD PRESSURE: 80 MMHG | WEIGHT: 242 LBS

## 2023-03-10 DIAGNOSIS — I10 ESSENTIAL HYPERTENSION: Primary | ICD-10-CM

## 2023-03-10 DIAGNOSIS — I10 ESSENTIAL HYPERTENSION: ICD-10-CM

## 2023-03-10 PROCEDURE — 99213 OFFICE O/P EST LOW 20 MIN: CPT

## 2023-03-10 RX ORDER — AMLODIPINE AND VALSARTAN 5; 320 MG/1; MG/1
1 TABLET ORAL DAILY
Qty: 30 TABLET | Refills: 1 | Status: SHIPPED | OUTPATIENT
Start: 2023-03-10 | End: 2023-04-07 | Stop reason: DRUGHIGH

## 2023-03-10 RX ORDER — AMLODIPINE AND VALSARTAN 5; 320 MG/1; MG/1
1 TABLET ORAL DAILY
Qty: 90 TABLET | OUTPATIENT
Start: 2023-03-10

## 2023-03-10 NOTE — PROGRESS NOTES
"Chief Complaint  Hypertension (1 week follow up)    Subjective        Tre Lau presents to [facility]  History of Present Illness  Patient is a 57-year-old male, last saw me in office on 2/28/2023 for hypertension.  Patient prescribed metoprolol 100 mg tablet 2 times daily and irbesartan 300 mg tablet daily.  Today, blood pressure 144/80. Average home BP log, 143-193/. Denies headache, visual changes, dizziness, shortness of air, chest pain, peripheral edema, hemiparesis, or slurred speech.    Objective   Vital Signs:  /80 (BP Location: Left arm, Patient Position: Sitting, Cuff Size: Large Adult)   Pulse 60   Temp 98.4 °F (36.9 °C) (Infrared)   Resp 18   Ht 175.3 cm (69\")   Wt 110 kg (242 lb)   SpO2 97%   BMI 35.74 kg/m²   Estimated body mass index is 35.74 kg/m² as calculated from the following:    Height as of this encounter: 175.3 cm (69\").    Weight as of this encounter: 110 kg (242 lb).             Physical Exam  Constitutional:       General: He is awake.      Appearance: Normal appearance.   HENT:      Head: Normocephalic and atraumatic.      Nose: Nose normal.   Eyes:      Extraocular Movements: Extraocular movements intact.      Conjunctiva/sclera: Conjunctivae normal.      Pupils: Pupils are equal, round, and reactive to light.   Cardiovascular:      Rate and Rhythm: Normal rate and regular rhythm.      Pulses: Normal pulses.      Heart sounds: Normal heart sounds.   Pulmonary:      Effort: Pulmonary effort is normal.      Breath sounds: Normal breath sounds and air entry.   Skin:     General: Skin is warm and dry.   Neurological:      General: No focal deficit present.      Mental Status: He is alert and oriented to person, place, and time. Mental status is at baseline.   Psychiatric:         Attention and Perception: Attention normal.         Behavior: Behavior normal. Behavior is cooperative.        Result Review :                   Assessment and " Plan   Diagnoses and all orders for this visit:    1. Essential hypertension (Primary)  -     amLODIPine-valsartan (EXFORGE) 5-320 MG per tablet; Take 1 tablet by mouth Daily.  Dispense: 30 tablet; Refill: 1    Stop taking irbesartan today.  Start taking combo medication of valsartan-amlodipine daily along with the metoprolol daily.    Goal blood pressure less than 140/90.    Follow-up in 1 month       Follow Up   Return in about 1 month (around 4/10/2023) for Recheck BP.  Patient was given instructions and counseling regarding his condition or for health maintenance advice. Please see specific information pulled into the AVS if appropriate.

## 2023-03-10 NOTE — PATIENT INSTRUCTIONS
Stop taking irbesartan today.  Start taking combo medication of valsartan-amlodipine daily along with the metoprolol daily.    Goal blood pressure less than 140/90.    Follow-up in 1 month

## 2023-03-21 ENCOUNTER — OFFICE VISIT (OUTPATIENT)
Dept: WOUND CARE | Facility: HOSPITAL | Age: 58
End: 2023-03-21
Payer: COMMERCIAL

## 2023-03-21 PROCEDURE — G0463 HOSPITAL OUTPT CLINIC VISIT: HCPCS

## 2023-04-06 DIAGNOSIS — Z09 FOLLOW-UP EXAM: ICD-10-CM

## 2023-04-06 DIAGNOSIS — I10 ESSENTIAL HYPERTENSION: ICD-10-CM

## 2023-04-06 RX ORDER — METOPROLOL TARTRATE 100 MG/1
TABLET ORAL
Qty: 180 TABLET | OUTPATIENT
Start: 2023-04-06

## 2023-04-07 ENCOUNTER — OFFICE VISIT (OUTPATIENT)
Dept: FAMILY MEDICINE CLINIC | Facility: CLINIC | Age: 58
End: 2023-04-07
Payer: COMMERCIAL

## 2023-04-07 VITALS
TEMPERATURE: 98.7 F | HEIGHT: 69 IN | BODY MASS INDEX: 35.13 KG/M2 | DIASTOLIC BLOOD PRESSURE: 80 MMHG | WEIGHT: 237.2 LBS | HEART RATE: 65 BPM | OXYGEN SATURATION: 100 % | SYSTOLIC BLOOD PRESSURE: 150 MMHG

## 2023-04-07 DIAGNOSIS — I10 ESSENTIAL HYPERTENSION: Primary | ICD-10-CM

## 2023-04-07 RX ORDER — METOPROLOL TARTRATE 100 MG/1
100 TABLET ORAL 2 TIMES DAILY
Qty: 180 TABLET | Refills: 1 | Status: SHIPPED | OUTPATIENT
Start: 2023-04-07

## 2023-04-07 RX ORDER — VALSARTAN 320 MG/1
320 TABLET ORAL DAILY
Qty: 90 TABLET | Refills: 1 | Status: SHIPPED | OUTPATIENT
Start: 2023-04-07

## 2023-04-07 RX ORDER — AMLODIPINE BESYLATE 10 MG/1
10 TABLET ORAL DAILY
Qty: 90 TABLET | Refills: 1 | Status: SHIPPED | OUTPATIENT
Start: 2023-04-07

## 2023-04-07 NOTE — PROGRESS NOTES
"Chief Complaint  Hypertension (1 month follow up/)    Subjective        Tre Lau presents to [facility]  History of Present Illness  Patient is a 57-year-old male, established patient of Dr. Farris.  Patient last saw me in office on 3/10/2022 for essential hypertension.  Patient was started on combo amlodipine-valsartan 5-320 mg tablet daily along with metoprolol 100 mg tablet 2 times daily with a goal BP less than 140/90.  Today, blood pressure 150/80. With home BP log, average s-home BP 120s-180s/70s-100s. Denies headache, visual changes, dizziness, shortness of air, chest pain, peripheral edema, hemiparesis, or slurred speech. Patient has upcoming surgery with Dr. Malone and needs BP mgmt.    Objective   Vital Signs:  /80 (BP Location: Right arm, Patient Position: Sitting, Cuff Size: Large Adult)   Pulse 65   Temp 98.7 °F (37.1 °C)   Ht 175.3 cm (69.02\")   Wt 108 kg (237 lb 3.2 oz)   SpO2 100%   BMI 35.01 kg/m²   Estimated body mass index is 35.01 kg/m² as calculated from the following:    Height as of this encounter: 175.3 cm (69.02\").    Weight as of this encounter: 108 kg (237 lb 3.2 oz).             Physical Exam  Constitutional:       General: He is awake.      Appearance: Normal appearance.   HENT:      Head: Normocephalic and atraumatic.      Nose: Nose normal.   Eyes:      Extraocular Movements: Extraocular movements intact.      Conjunctiva/sclera: Conjunctivae normal.      Pupils: Pupils are equal, round, and reactive to light.   Cardiovascular:      Rate and Rhythm: Normal rate and regular rhythm.      Pulses: Normal pulses.      Heart sounds: Normal heart sounds.   Pulmonary:      Effort: Pulmonary effort is normal.      Breath sounds: Normal breath sounds and air entry.   Skin:     General: Skin is warm and dry.   Neurological:      General: No focal deficit present.      Mental Status: He is alert and oriented to person, place, and time. Mental status is " at baseline.   Psychiatric:         Attention and Perception: Attention normal.         Behavior: Behavior normal. Behavior is cooperative.        Result Review :                   Assessment and Plan   Diagnoses and all orders for this visit:    1. Essential hypertension (Primary)  -     Discontinue: amLODIPine 10 MG tablet 10 mg, valsartan 320 MG tablet 320 mg; Take 1 dose by mouth Daily.  Dispense: 90 tablet; Refill: 1  -     metoprolol tartrate (Lopressor) 100 MG tablet; Take 1 tablet by mouth 2 (Two) Times a Day.  Dispense: 180 tablet; Refill: 1  -     Discontinue: amLODIPine 10 MG tablet 10 mg, valsartan 320 MG tablet 320 mg; Take 1 dose by mouth Daily.  Dispense: 90 tablet; Refill: 1  -     amLODIPine (NORVASC) 10 MG tablet; Take 1 tablet by mouth Daily.  Dispense: 90 tablet; Refill: 1  -     valsartan (DIOVAN) 320 MG tablet; Take 1 tablet by mouth Daily.  Dispense: 90 tablet; Refill: 1    Keep a maximum total sodium intake to <1,500mg daily.    Goal BP <140/90. If your home BP still elevated, please follow up with me. My last day is 5/9.    Patient agrees with plan of care and understands instructions. Call if worsening symptoms or any problems or concerns.          Follow Up   Return in about 6 months (around 10/7/2023) for come for fasting labs and get reestablished with new provider in our office.  Patient was given instructions and counseling regarding his condition or for health maintenance advice. Please see specific information pulled into the AVS if appropriate.

## 2023-04-07 NOTE — PATIENT INSTRUCTIONS
Keep a maximum total sodium intake to <1,500mg daily.    Goal BP <140/90. If your home BP still elevated, please follow up with me. My last day is 5/9.    Patient agrees with plan of care and understands instructions. Call if worsening symptoms or any problems or concerns.

## 2023-04-18 ENCOUNTER — OFFICE VISIT (OUTPATIENT)
Dept: WOUND CARE | Facility: HOSPITAL | Age: 58
End: 2023-04-18
Payer: COMMERCIAL

## 2023-04-18 PROCEDURE — G0463 HOSPITAL OUTPT CLINIC VISIT: HCPCS

## 2023-05-16 ENCOUNTER — HOSPITAL ENCOUNTER (OUTPATIENT)
Dept: CARDIOLOGY | Facility: HOSPITAL | Age: 58
Discharge: HOME | End: 2023-05-16
Payer: COMMERCIAL

## 2023-05-16 ENCOUNTER — OFFICE VISIT (OUTPATIENT)
Dept: WOUND CARE | Facility: HOSPITAL | Age: 58
End: 2023-05-16
Payer: COMMERCIAL

## 2023-05-16 ENCOUNTER — LAB (OUTPATIENT)
Dept: LAB | Facility: HOSPITAL | Age: 58
End: 2023-05-16
Payer: COMMERCIAL

## 2023-05-16 DIAGNOSIS — S01.20XA OPEN WOUND OF NOSE, UNSPECIFIED OPEN WOUND TYPE, INITIAL ENCOUNTER: ICD-10-CM

## 2023-05-16 DIAGNOSIS — S01.20XA OPEN WOUND OF NOSE, UNSPECIFIED OPEN WOUND TYPE, INITIAL ENCOUNTER: Primary | ICD-10-CM

## 2023-05-16 LAB
ANION GAP SERPL CALCULATED.3IONS-SCNC: 8 MMOL/L (ref 5–15)
BUN SERPL-MCNC: 12 MG/DL (ref 6–20)
BUN/CREAT SERPL: 14.1 (ref 7–25)
CALCIUM SPEC-SCNC: 8.7 MG/DL (ref 8.6–10.5)
CHLORIDE SERPL-SCNC: 105 MMOL/L (ref 98–107)
CO2 SERPL-SCNC: 26 MMOL/L (ref 22–29)
CREAT SERPL-MCNC: 0.85 MG/DL (ref 0.76–1.27)
DEPRECATED RDW RBC AUTO: 45.7 FL (ref 37–54)
EGFRCR SERPLBLD CKD-EPI 2021: 101.4 ML/MIN/1.73
ERYTHROCYTE [DISTWIDTH] IN BLOOD BY AUTOMATED COUNT: 13.4 % (ref 12.3–15.4)
GLUCOSE SERPL-MCNC: 88 MG/DL (ref 65–99)
HCT VFR BLD AUTO: 43.4 % (ref 37.5–51)
HGB BLD-MCNC: 14.7 G/DL (ref 13–17.7)
MCH RBC QN AUTO: 31.2 PG (ref 26.6–33)
MCHC RBC AUTO-ENTMCNC: 33.9 G/DL (ref 31.5–35.7)
MCV RBC AUTO: 92.1 FL (ref 79–97)
PLATELET # BLD AUTO: 246 10*3/MM3 (ref 140–450)
PMV BLD AUTO: 10.9 FL (ref 6–12)
POTASSIUM SERPL-SCNC: 4.5 MMOL/L (ref 3.5–5.2)
QT INTERVAL: 423 MS
RBC # BLD AUTO: 4.71 10*6/MM3 (ref 4.14–5.8)
SODIUM SERPL-SCNC: 139 MMOL/L (ref 136–145)
WBC NRBC COR # BLD: 11.06 10*3/MM3 (ref 3.4–10.8)

## 2023-05-16 PROCEDURE — 36415 COLL VENOUS BLD VENIPUNCTURE: CPT

## 2023-05-16 PROCEDURE — 85027 COMPLETE CBC AUTOMATED: CPT

## 2023-05-16 PROCEDURE — 93005 ELECTROCARDIOGRAM TRACING: CPT | Performed by: SURGERY

## 2023-05-16 PROCEDURE — 80048 BASIC METABOLIC PNL TOTAL CA: CPT

## 2023-05-16 PROCEDURE — G0463 HOSPITAL OUTPT CLINIC VISIT: HCPCS

## 2023-05-21 DIAGNOSIS — I10 ESSENTIAL HYPERTENSION: ICD-10-CM

## 2023-05-21 DIAGNOSIS — Z09 FOLLOW-UP EXAM: ICD-10-CM

## 2023-05-24 NOTE — TELEPHONE ENCOUNTER
PATIENT CALLED BACK 100MG IS CORRECT AND HE DOES NOT NEED A REFILL FYI      Called l/m to call back re: dosing/directions.

## 2023-05-25 RX ORDER — METOPROLOL TARTRATE 50 MG/1
TABLET, FILM COATED ORAL
Qty: 180 TABLET | Refills: 3 | OUTPATIENT
Start: 2023-05-25

## 2023-06-06 ENCOUNTER — OFFICE VISIT (OUTPATIENT)
Dept: WOUND CARE | Facility: HOSPITAL | Age: 58
End: 2023-06-06
Payer: COMMERCIAL

## 2023-06-06 PROCEDURE — 97602 WOUND(S) CARE NON-SELECTIVE: CPT

## 2023-06-13 ENCOUNTER — OFFICE VISIT (OUTPATIENT)
Dept: WOUND CARE | Facility: HOSPITAL | Age: 58
End: 2023-06-13
Payer: COMMERCIAL

## 2023-06-13 PROCEDURE — G0463 HOSPITAL OUTPT CLINIC VISIT: HCPCS

## 2023-07-17 NOTE — H&P
Chief Complaint  Information obtained from Patient  57 year old with open nasal wound s/p paramedian nasal forehead flap      History of Present Illness (HPI)  57-year-old male with long history of a nasal scar follow soft tissue loss after a spider bite. The patient has chronic scarring over the nasal bridge with ulcer development over the edges perhaps from his mask or glasses. The patient was seen in the wound care center and begin on Betadine dressing care to the nasal ulcers, he is doing well with his treatments. The patient is s/p a paramedial nasal forehead flap reconstruction of the nose during June of 2023.   The patient is now ready for the second stage of his reconstruction procedure to the nose, this will involve transection of the paramedian nasal forehead flap and inset of the flap. The patient has a forehead scalp donor site defect which may require reconstruction with an Integra Bi layered Matrix Graft.      PMHx: Cardiac and Vasculature, Paroxysmal atrial fibrillation, Essential hypertension, Mixed hyperlipidemia      Patient History  Information obtained from Patient, Chart.    Family History  Cancer - Mother, Heart Disease - Father, Hypertension - Father,  No family history of Diabetes, Hereditary Spherocytosis, Kidney Disease, Lung Disease, Seizures, Stroke, Thyroid Problems, Tuberculosis.    Social History  Former smoker, Marital Status - , Alcohol Use - Moderate, Drug Use - No History, Caffeine Use - Rarely.    Medical History  Eyes  Denies history of Cataracts, Glaucoma, Optic Neuritis  Ear/Nose/Mouth/Throat  Denies history of Chronic sinus problems/congestion, Middle ear problems  Hematologic/Lymphatic  Denies history of Anemia, Hemophilia, Human Immunodeficiency Virus, Lymphedema, Sickle Cell Disease  Respiratory  Patient has history of Sleep Apnea  Denies history of Aspiration, Asthma, Chronic Obstructive Pulmonary Disease (COPD), Pneumothorax, Tuberculosis  Cardiovascular  Patient  has history of Arrhythmia - afib, Hypertension  Denies history of Angina, Congestive Heart Failure, Coronary Artery Disease, Deep Vein Thrombosis, Hypotension, Myocardial Infarction, Peripheral Arterial Disease, Peripheral Venous Disease, Phlebitis, Vasculitis  Gastrointestinal  Denies history of Cirrhosis , Colitis, Crohn’s, Hepatitis A, Hepatitis B, Hepatitis C  Endocrine  Denies history of Type I Diabetes, Type II Diabetes, Thyroid Disease, Hepatitis  Genitourinary  Denies history of End Stage Renal Disease  Immunological  Denies history of Lupus Erythematosus, Raynaud’s, Scleroderma  Integumentary (Skin)  Denies history of History of Burn  Musculoskeletal  Patient has history of Osteoarthritis  Denies history of Gout, Rheumatoid Arthritis, Osteomyelitis  Neurologic  Denies history of Dementia, Neuropathy, Quadriplegia, Paraplegia, Seizure Disorder  Oncologic  Denies history of Received Chemotherapy, Received Radiation  Psychiatric  Denies history of Anorexia/bulimia, Confinement Anxiety    Hospitalization/Surgery History - left knee replacement.      Objective    Constitutional  Vitals Time Taken: 9:30 AM, Height: 68 in, Weight: 242 lbs, BMI: 36.8, Pulse: 68 bpm, Respiratory Rate: 16 breaths/min, Blood Pressure: 144/89 mmHg.    Psychiatric  this patient is able to make decisions and demonstrates good insight into disease process. Alert and Oriented x 4.      Integumentary (Hair, Skin)  Nasal flap: pink and viable with signs of good perfusion. No drainage. Central forehead: ulcer is richard. Bridge over the flap is less edematous.. For complete descriptions of all wounds, see following section on detailed wound assessments..    Wound #2 status is Open. Original cause of wound was Surgical Injury. The date acquired was: 6/2/2023. The wound has been in treatment 5 weeks. The wound is currently classified as a Unclassifiable wound with etiology of Open Surgical Wound and is located on the Right Abdomen - Lower  Quadrant. The wound measures 0cm length x 0cm width x 0cm depth; 0cm^2 area and 0cm^3 volume. There is a none present amount of drainage noted. There is no granulation within the wound bed. There is no necrotic tissue within the wound bed.  General Notes: stitches intact    Wound #4 status is Open. Original cause of wound was Surgical Injury. The date acquired was: 6/2/2023. The wound has been in treatment 5 weeks. The wound is currently classified as a Full Thickness Without Exposed Support Structures wound with etiology of Open Surgical Wound and is located on the Midline Head - frontal. The wound measures 1.2cm length x 1.2cm width x 0.2cm depth; 1.131cm^2 area and 0.226cm^3 volume. There is Fat Layer (Subcutaneous Tissue) exposed. There is no tunneling or undermining noted. There is a medium amount of drainage noted. There is medium (34-66%) granulation within the wound bed. There is a small (1-33%) amount of necrotic tissue within the wound bed including Adherent Slough. Periwound temperature was noted as No Abnormality.    Wound #5R status is Open. Original cause of wound was Surgical Injury. The date acquired was: 6/11/2023. The wound has been in treatment 5 weeks. The wound is currently classified as a Full Thickness Without Exposed Support Structures wound with etiology of Open Surgical Wound and is located on the Nose. The wound measures 0.1cm length x 0.1cm width x 0.1cm depth; 0.008cm^2 area and 0.001cm^3 volume. There is a none present amount of drainage noted. There is no granulation within the wound bed. There is no necrotic tissue within the wound bed. The periwound skin appearance had no abnormalities noted for texture. The periwound skin appearance had no abnormalities noted for color.        Assessment  The patient is healing well over the flap site with no flap ischemia . The forehead has a region of exposed calvarium, the tissue over the peripheral border is pink and healthy.  Plan to continue  with dressing care to the forehead donor site and the flap bridge will be ready for transection June 20, 2023 at [facility]. The scalp donor site defect may need a Integra Graft to reconstruct the area which has exposed calvarium.  NPO the night before the surgery.         Plan  Follow-up Appointments:  Return Appointment in 2 weeks.  Dressing Change Frequency:  Wound #5R Nose:    Change dressing twice weekly.    Other: - Saline soaked gauze wet to dry to forehead small area on nose  Secondary Dressing:  Wound #4 Midline Head - frontal:    16 sq in or less    Foam - Bordered  [facility] Contact Information:  [facility] Main Number: [phone]  [facility] Fax Number: [phone]    Electronic Signature(s)  Signed: 7/11/2023 11:35:39 AM By: Johny Malone MD

## 2023-07-20 ENCOUNTER — HOSPITAL ENCOUNTER (OUTPATIENT)
Facility: SURGERY CENTER | Age: 58
Setting detail: HOSPITAL OUTPATIENT SURGERY
Discharge: HOME | End: 2023-07-20
Attending: SURGERY | Admitting: SURGERY
Payer: COMMERCIAL

## 2023-07-20 VITALS
RESPIRATION RATE: 16 BRPM | HEART RATE: 72 BPM | WEIGHT: 246 LBS | SYSTOLIC BLOOD PRESSURE: 146 MMHG | BODY MASS INDEX: 36.43 KG/M2 | TEMPERATURE: 98 F | HEIGHT: 69 IN | OXYGEN SATURATION: 94 % | DIASTOLIC BLOOD PRESSURE: 83 MMHG

## 2023-07-20 DIAGNOSIS — S01.00XA OPEN WOUND OF SCALP, UNSPECIFIED OPEN WOUND TYPE, INITIAL ENCOUNTER: Primary | ICD-10-CM

## 2023-07-20 PROCEDURE — 25010000002 MIDAZOLAM PER 1 MG: Performed by: ANESTHESIOLOGY

## 2023-07-20 PROCEDURE — 25010000002 CEFAZOLIN IN DEXTROSE 2000 MG/ 100 ML SOLUTION: Performed by: SURGERY

## 2023-07-20 PROCEDURE — 25010000002 FENTANYL CITRATE (PF) 50 MCG/ML SOLUTION: Performed by: ANESTHESIOLOGY

## 2023-07-20 PROCEDURE — 11042 DBRDMT SUBQ TIS 1ST 20SQCM/<: CPT | Performed by: SURGERY

## 2023-07-20 RX ORDER — LIDOCAINE HYDROCHLORIDE 10 MG/ML
0.5 INJECTION, SOLUTION INFILTRATION; PERINEURAL ONCE AS NEEDED
Status: DISCONTINUED | OUTPATIENT
Start: 2023-07-20 | End: 2023-07-20 | Stop reason: HOSPADM

## 2023-07-20 RX ORDER — SODIUM CHLORIDE, SODIUM LACTATE, POTASSIUM CHLORIDE, CALCIUM CHLORIDE 600; 310; 30; 20 MG/100ML; MG/100ML; MG/100ML; MG/100ML
1000 INJECTION, SOLUTION INTRAVENOUS CONTINUOUS
Status: DISCONTINUED | OUTPATIENT
Start: 2023-07-20 | End: 2023-07-20 | Stop reason: HOSPADM

## 2023-07-20 RX ORDER — BUPIVACAINE HYDROCHLORIDE AND EPINEPHRINE 5; 5 MG/ML; UG/ML
INJECTION, SOLUTION PERINEURAL AS NEEDED
Status: DISCONTINUED | OUTPATIENT
Start: 2023-07-20 | End: 2023-07-20 | Stop reason: HOSPADM

## 2023-07-20 RX ORDER — FENTANYL CITRATE 50 UG/ML
50 INJECTION, SOLUTION INTRAMUSCULAR; INTRAVENOUS
Status: DISCONTINUED | OUTPATIENT
Start: 2023-07-20 | End: 2023-07-20 | Stop reason: HOSPADM

## 2023-07-20 RX ORDER — SODIUM CHLORIDE 0.9 % (FLUSH) 0.9 %
3 SYRINGE (ML) INJECTION EVERY 12 HOURS SCHEDULED
Status: DISCONTINUED | OUTPATIENT
Start: 2023-07-20 | End: 2023-07-20 | Stop reason: HOSPADM

## 2023-07-20 RX ORDER — FAMOTIDINE 10 MG/ML
20 INJECTION, SOLUTION INTRAVENOUS ONCE
Status: COMPLETED | OUTPATIENT
Start: 2023-07-20 | End: 2023-07-20

## 2023-07-20 RX ORDER — SODIUM CHLORIDE, SODIUM LACTATE, POTASSIUM CHLORIDE, CALCIUM CHLORIDE 600; 310; 30; 20 MG/100ML; MG/100ML; MG/100ML; MG/100ML
9 INJECTION, SOLUTION INTRAVENOUS CONTINUOUS
Status: DISCONTINUED | OUTPATIENT
Start: 2023-07-20 | End: 2023-07-20 | Stop reason: HOSPADM

## 2023-07-20 RX ORDER — FENTANYL CITRATE 50 UG/ML
50 INJECTION, SOLUTION INTRAMUSCULAR; INTRAVENOUS ONCE AS NEEDED
Status: DISCONTINUED | OUTPATIENT
Start: 2023-07-20 | End: 2023-07-20 | Stop reason: HOSPADM

## 2023-07-20 RX ORDER — FLUMAZENIL 0.1 MG/ML
0.2 INJECTION INTRAVENOUS AS NEEDED
Status: DISCONTINUED | OUTPATIENT
Start: 2023-07-20 | End: 2023-07-20 | Stop reason: HOSPADM

## 2023-07-20 RX ORDER — SODIUM CHLORIDE 0.9 % (FLUSH) 0.9 %
10 SYRINGE (ML) INJECTION AS NEEDED
Status: DISCONTINUED | OUTPATIENT
Start: 2023-07-20 | End: 2023-07-20 | Stop reason: HOSPADM

## 2023-07-20 RX ORDER — MIDAZOLAM HYDROCHLORIDE 1 MG/ML
1 INJECTION INTRAMUSCULAR; INTRAVENOUS
Status: DISCONTINUED | OUTPATIENT
Start: 2023-07-20 | End: 2023-07-20 | Stop reason: HOSPADM

## 2023-07-20 RX ORDER — CEPHALEXIN 500 MG/1
500 CAPSULE ORAL 2 TIMES DAILY
Qty: 10 CAPSULE | Refills: 0 | Status: SHIPPED | OUTPATIENT
Start: 2023-07-20 | End: 2023-07-26

## 2023-07-20 RX ORDER — CEFAZOLIN SODIUM 2 G/100ML
2000 INJECTION, SOLUTION INTRAVENOUS ONCE
Status: COMPLETED | OUTPATIENT
Start: 2023-07-20 | End: 2023-07-20

## 2023-07-20 RX ORDER — DIPHENHYDRAMINE HYDROCHLORIDE 50 MG/ML
12.5 INJECTION INTRAMUSCULAR; INTRAVENOUS
Status: DISCONTINUED | OUTPATIENT
Start: 2023-07-20 | End: 2023-07-20 | Stop reason: HOSPADM

## 2023-07-20 RX ORDER — PROMETHAZINE HYDROCHLORIDE 25 MG/1
25 SUPPOSITORY RECTAL ONCE AS NEEDED
Status: DISCONTINUED | OUTPATIENT
Start: 2023-07-20 | End: 2023-07-20 | Stop reason: HOSPADM

## 2023-07-20 RX ORDER — HYDROCODONE BITARTRATE AND ACETAMINOPHEN 5; 325 MG/1; MG/1
2 TABLET ORAL EVERY 6 HOURS PRN
Qty: 20 TABLET | Refills: 0 | Status: SHIPPED | OUTPATIENT
Start: 2023-07-20 | End: 2023-07-26

## 2023-07-20 RX ORDER — NALOXONE HCL 0.4 MG/ML
0.2 VIAL (ML) INJECTION AS NEEDED
Status: DISCONTINUED | OUTPATIENT
Start: 2023-07-20 | End: 2023-07-20 | Stop reason: HOSPADM

## 2023-07-20 RX ORDER — HYDROCODONE BITARTRATE AND ACETAMINOPHEN 5; 325 MG/1; MG/1
1 TABLET ORAL EVERY 6 HOURS PRN
Status: DISCONTINUED | OUTPATIENT
Start: 2023-07-20 | End: 2023-07-20 | Stop reason: HOSPADM

## 2023-07-20 RX ORDER — SODIUM CHLORIDE 0.9 % (FLUSH) 0.9 %
3-10 SYRINGE (ML) INJECTION AS NEEDED
Status: DISCONTINUED | OUTPATIENT
Start: 2023-07-20 | End: 2023-07-20 | Stop reason: HOSPADM

## 2023-07-20 RX ORDER — DROPERIDOL 2.5 MG/ML
0.62 INJECTION, SOLUTION INTRAMUSCULAR; INTRAVENOUS
Status: DISCONTINUED | OUTPATIENT
Start: 2023-07-20 | End: 2023-07-20 | Stop reason: HOSPADM

## 2023-07-20 RX ORDER — MIDAZOLAM HYDROCHLORIDE 1 MG/ML
INJECTION INTRAMUSCULAR; INTRAVENOUS
Status: DISCONTINUED
Start: 2023-07-20 | End: 2023-07-20 | Stop reason: HOSPADM

## 2023-07-20 RX ORDER — CEFAZOLIN SODIUM 1 G/3ML
INJECTION, POWDER, FOR SOLUTION INTRAMUSCULAR; INTRAVENOUS
Status: DISCONTINUED
Start: 2023-07-20 | End: 2023-07-20 | Stop reason: HOSPADM

## 2023-07-20 RX ORDER — MAGNESIUM HYDROXIDE 1200 MG/15ML
LIQUID ORAL AS NEEDED
Status: DISCONTINUED | OUTPATIENT
Start: 2023-07-20 | End: 2023-07-20 | Stop reason: HOSPADM

## 2023-07-20 RX ORDER — GINSENG 100 MG
CAPSULE ORAL AS NEEDED
Status: DISCONTINUED | OUTPATIENT
Start: 2023-07-20 | End: 2023-07-20 | Stop reason: HOSPADM

## 2023-07-20 RX ORDER — ONDANSETRON 2 MG/ML
4 INJECTION INTRAMUSCULAR; INTRAVENOUS ONCE AS NEEDED
Status: DISCONTINUED | OUTPATIENT
Start: 2023-07-20 | End: 2023-07-20 | Stop reason: HOSPADM

## 2023-07-20 RX ORDER — PROMETHAZINE HYDROCHLORIDE 12.5 MG/1
25 TABLET ORAL ONCE AS NEEDED
Status: DISCONTINUED | OUTPATIENT
Start: 2023-07-20 | End: 2023-07-20 | Stop reason: HOSPADM

## 2023-07-20 RX ADMIN — SODIUM CHLORIDE, POTASSIUM CHLORIDE, SODIUM LACTATE AND CALCIUM CHLORIDE 1000 ML: 600; 310; 30; 20 INJECTION, SOLUTION INTRAVENOUS at 09:17

## 2023-07-20 RX ADMIN — MIDAZOLAM HYDROCHLORIDE 1 MG: 1 INJECTION, SOLUTION INTRAMUSCULAR; INTRAVENOUS at 11:00

## 2023-07-20 RX ADMIN — FENTANYL CITRATE 50 MCG: 0.05 INJECTION, SOLUTION INTRAMUSCULAR; INTRAVENOUS at 14:49

## 2023-07-20 RX ADMIN — HYDROCODONE BITARTRATE AND ACETAMINOPHEN 1 TABLET: 5; 325 TABLET ORAL at 15:20

## 2023-07-20 RX ADMIN — FAMOTIDINE 20 MG: 10 INJECTION INTRAVENOUS at 11:01

## 2023-07-20 RX ADMIN — CEFAZOLIN SODIUM 2000 MG: 2 INJECTION, SOLUTION INTRAVENOUS at 11:04

## 2023-07-20 NOTE — BRIEF OP NOTE
REPAIR / RECONSTRUCTION LIP  Progress Note    Tre SOHAM Lau  7/20/2023    Pre-op Diagnosis:   Open wound of nasal septum, complicated [S01.20XA]  Toxic effect of bite of venomous spider [T63.301A]       Post-Op Diagnosis Codes:     * Open wound of nasal septum, complicated [S01.20XA]     * Toxic effect of bite of venomous spider [T63.301A]    Procedure/CPT® Codes:    Procedure(s):  TAKE DOWN OF  PARAMEDIAN FOREHEAD FLAP  2.   REVISION OF FOREHEAD/SCALP WOUND   3: DUAL LAYER CLOSURE OF FOREHEAD 8 CM     Surgeon(s):  Mike Malone MD    Anesthesia: General    Staff:   Circulator: Mandie Bautista RN; Deandra Camacho RN  Scrub Person: Sheryl Lester; Cait Riggins RN       Estimated Blood Loss: minimal    Urine Voided: * No values recorded between 7/20/2023 11:06 AM and 7/20/2023  2:32 PM *    Specimens:                None        Drains: * No LDAs found *    Findings: None      Complications: None      Mike Malone MD     Date: 7/20/2023  Time: 14:35 EDT

## 2023-07-20 NOTE — INTERVAL H&P NOTE
H&P reviewed. The patient was examined and there are no changes to the H&P.      The patient had nothing to eat or drink after midnight, all surgery questions answered for his procedure.

## 2023-07-24 NOTE — OP NOTE
Date:  7/20/2023     ADMITTING DIAGNOSIS:  1. Nasal wound s/p forehead paramedian nasal flap  2. Open forehead wound with exposed Calvarium      POST OP DIAGNOSIS:   1. Nasal wound s/p forehead paramedian nasal flap  2. Open forehead wound with exposed Calvarium        PROCEDURES PERFORMED:  Delayed Paramedial Flap transection with inset over the nose  (planned staged surgical procedure).   Debridement of forehead open wound of site of delayed healing (2 x 3 cm), non-planned surgical procedure    Dual Layer closure of forehead open wound 8 cm (Non-planned surgical procedure).     SURGEON: JASON Malone M.D.     ANESTHESIA: General.      SPECIMENS: None.      DRAIN PLACEMENT: None     ESTIMATED BLOOD LOSS: Minimal     COMPLICATIONS: None apparent.       INDICATIONS: 57-year-old male with long history of a nasal open wound following a spider bite in the past. The patient is s/p a paramedial nasal forehead flap reconstruction of the nose during June of 2023.  The patient has a region of delayed healing with an open wound over the donor site of the forehead with exposed calvarium.  The patient is undergoing wound care to the forehead region. The patient is now ready for the second stage of his reconstruction procedure to the nose, this will involve transection of the paramedian nasal forehead flap and inset of the flap. The division and inset of the paramedian flap is a planned second stage procedure for the flap reconstruction of the nose.  The site of delayed healing over the forehead will need to be debrided and repaired with a dual layer closure, this area of surgery is non-planned and due to delayed healing over the donor site.  The patient understands the risks and benefits of the surgery and desires to proceed.       DESCRIPTION OF PROCEDURE:   The patient entered the operating room after receiving Ancef  2gm  IV in the holding suite.   The patient entered the operating room with SCDs in place over both legs.  The patient was placed supine on the OR table, after intubation the forehead and nasal regions were prepped and draped in the usual sterile fashion with Betadine.  A time out was taken by the OR staff. The forehead and nasal flap were  marked with a sterile marker, these regions were infiltrated around the periphery and centrally with 0.25% Marcaine with epinephrine, 15 cc total. Attention was focused on the paramedial nasal forehead flap, the flap was transected over the superior border of the nasal tip, the flap was defatted and inset into the nasal tip with interrupted 5-0 Monocryl and 6-0 Nylon suture over the superior aspect of the flap. The remaining flap could not be defatted as this wound disrupt the microcirculation to the lower aspect of the flap.  The paramedian flap was transected over the glabellar area and the skin over the forehead was repaired with interrupted 5-0 Monocryl and 5-0 Nylon near the right medial eyebrow.      Next attention was focused over the superior forehead near the frontal hairline, there was an open wound with exposed calvarium.  This area of delayed healing over the proximal forehead wound need to be repaired to prevent future osteomyelitis over the calvarium. This is a non planned surgical procedure. The forehead region was debrided in an excisional manner with a scalpel, the debrided zone was 2 x 3 cm.  The debridement of the forehead open wound involved excisional debridement of viable and non viable: skin, soft tissue fat, and frontalis scarred muscle.  The debridement was performed in an excisional fashion with the scalpel.  Bleeding was controlled with electrocautery and was mild. Next, the forehead and frontal scalp were undermined 6 cm with a freer elevator over the periosteum.  Hemostasis was assured with electrocautery. The frontalis muscle  was then re approximated over the calvarial defect with interrupted 5-0 Monocryl suture. This provided muscular coverage over the  calvarium.  Next the skin over the frontal area was re approximated over the open wound with interrupted 5-0 Monocryl and 3-0, 4-0 Nylon suture in a dual layer fashion.  The open wound defect closure site was 8 cm in length and curvilinear in shape.  At the completion of the procedure the sponge and needle counts were correct. The patient was extubated and  transferred to recovery in stable condition.  A non-stick dressing was placed over the forehead.  Dr. Malone was present throughout the entire operative procedure.          AJSON Malone M.D.

## 2023-07-25 ENCOUNTER — OFFICE VISIT (OUTPATIENT)
Dept: WOUND CARE | Facility: HOSPITAL | Age: 58
End: 2023-07-25
Payer: COMMERCIAL

## 2023-07-25 PROCEDURE — G0463 HOSPITAL OUTPT CLINIC VISIT: HCPCS

## 2023-07-26 ENCOUNTER — OFFICE VISIT (OUTPATIENT)
Dept: FAMILY MEDICINE CLINIC | Facility: CLINIC | Age: 58
End: 2023-07-26
Payer: COMMERCIAL

## 2023-07-26 VITALS
DIASTOLIC BLOOD PRESSURE: 72 MMHG | SYSTOLIC BLOOD PRESSURE: 142 MMHG | HEIGHT: 69 IN | BODY MASS INDEX: 36.4 KG/M2 | TEMPERATURE: 98 F | WEIGHT: 245.8 LBS | OXYGEN SATURATION: 98 % | HEART RATE: 61 BPM

## 2023-07-26 DIAGNOSIS — Z12.5 SCREENING PSA (PROSTATE SPECIFIC ANTIGEN): ICD-10-CM

## 2023-07-26 DIAGNOSIS — I10 ESSENTIAL HYPERTENSION: Primary | ICD-10-CM

## 2023-07-26 DIAGNOSIS — Z00.00 ANNUAL PHYSICAL EXAM: ICD-10-CM

## 2023-07-26 DIAGNOSIS — Z76.89 ENCOUNTER TO ESTABLISH CARE: ICD-10-CM

## 2023-07-26 DIAGNOSIS — S01.20XD: ICD-10-CM

## 2023-07-26 DIAGNOSIS — E78.2 MIXED HYPERLIPIDEMIA: ICD-10-CM

## 2023-07-26 DIAGNOSIS — I48.0 PAROXYSMAL ATRIAL FIBRILLATION: ICD-10-CM

## 2023-07-26 DIAGNOSIS — Z00.00 ENCOUNTER FOR MEDICAL EXAMINATION TO ESTABLISH CARE: ICD-10-CM

## 2023-07-26 PROBLEM — S01.20XA: Status: ACTIVE | Noted: 2023-06-02

## 2023-07-26 LAB
ALBUMIN SERPL-MCNC: 4.4 G/DL (ref 3.5–5.2)
ALBUMIN/GLOB SERPL: 1.9 G/DL
ALP SERPL-CCNC: 121 U/L (ref 39–117)
ALT SERPL-CCNC: 50 U/L (ref 1–41)
AST SERPL-CCNC: 18 U/L (ref 1–40)
BASOPHILS # BLD AUTO: 0.09 10*3/MM3 (ref 0–0.2)
BASOPHILS NFR BLD AUTO: 0.8 % (ref 0–1.5)
BILIRUB SERPL-MCNC: 0.3 MG/DL (ref 0–1.2)
BUN SERPL-MCNC: 13 MG/DL (ref 6–20)
BUN/CREAT SERPL: 13.3 (ref 7–25)
CALCIUM SERPL-MCNC: 9.5 MG/DL (ref 8.6–10.5)
CHLORIDE SERPL-SCNC: 103 MMOL/L (ref 98–107)
CHOLEST SERPL-MCNC: 161 MG/DL (ref 0–200)
CO2 SERPL-SCNC: 25.9 MMOL/L (ref 22–29)
CREAT SERPL-MCNC: 0.98 MG/DL (ref 0.76–1.27)
EGFRCR SERPLBLD CKD-EPI 2021: 89.9 ML/MIN/1.73
EOSINOPHIL # BLD AUTO: 0.41 10*3/MM3 (ref 0–0.4)
EOSINOPHIL NFR BLD AUTO: 3.8 % (ref 0.3–6.2)
ERYTHROCYTE [DISTWIDTH] IN BLOOD BY AUTOMATED COUNT: 12.3 % (ref 12.3–15.4)
GLOBULIN SER CALC-MCNC: 2.3 GM/DL
GLUCOSE SERPL-MCNC: 103 MG/DL (ref 65–99)
HCT VFR BLD AUTO: 40.5 % (ref 37.5–51)
HDLC SERPL-MCNC: 45 MG/DL (ref 40–60)
HGB BLD-MCNC: 13.6 G/DL (ref 13–17.7)
IMM GRANULOCYTES # BLD AUTO: 0.05 10*3/MM3 (ref 0–0.05)
IMM GRANULOCYTES NFR BLD AUTO: 0.5 % (ref 0–0.5)
LDLC SERPL CALC-MCNC: 85 MG/DL (ref 0–100)
LYMPHOCYTES # BLD AUTO: 2.4 10*3/MM3 (ref 0.7–3.1)
LYMPHOCYTES NFR BLD AUTO: 22.1 % (ref 19.6–45.3)
MCH RBC QN AUTO: 31.1 PG (ref 26.6–33)
MCHC RBC AUTO-ENTMCNC: 33.6 G/DL (ref 31.5–35.7)
MCV RBC AUTO: 92.5 FL (ref 79–97)
MONOCYTES # BLD AUTO: 1.08 10*3/MM3 (ref 0.1–0.9)
MONOCYTES NFR BLD AUTO: 10 % (ref 5–12)
NEUTROPHILS # BLD AUTO: 6.81 10*3/MM3 (ref 1.7–7)
NEUTROPHILS NFR BLD AUTO: 62.8 % (ref 42.7–76)
NRBC BLD AUTO-RTO: 0 /100 WBC (ref 0–0.2)
PLATELET # BLD AUTO: 253 10*3/MM3 (ref 140–450)
POTASSIUM SERPL-SCNC: 5.4 MMOL/L (ref 3.5–5.2)
PROT SERPL-MCNC: 6.7 G/DL (ref 6–8.5)
PSA SERPL-MCNC: 2.9 NG/ML (ref 0–4)
RBC # BLD AUTO: 4.38 10*6/MM3 (ref 4.14–5.8)
SODIUM SERPL-SCNC: 138 MMOL/L (ref 136–145)
TRIGL SERPL-MCNC: 182 MG/DL (ref 0–150)
TSH SERPL DL<=0.005 MIU/L-ACNC: 2.39 UIU/ML (ref 0.27–4.2)
VLDLC SERPL CALC-MCNC: 31 MG/DL (ref 5–40)
WBC # BLD AUTO: 10.84 10*3/MM3 (ref 3.4–10.8)

## 2023-07-26 RX ORDER — PSEUDOEPHEDRINE HCL 30 MG
100 TABLET ORAL
COMMUNITY
Start: 2023-06-04 | End: 2023-07-26

## 2023-07-26 NOTE — PROGRESS NOTES
"Chief Complaint  Establish Care (Checking BP/Fasting)    Subjective        Tre Lau presents to [facility]  History of Present Illness  Patient presents to the office today to establish care. He is here today for annual physical exam. With Hypertension he is taking amlodipine/valsartan5/320mg , metoprolol 100mg BID. Blood pressure today is 142/72. He deneis CP SOA.  With atrial fibrillation has appt scheduled with cardiology in December.   With history of spider bite he is currently under care of wound care/plastic surgeon. He is stable has bandage applied to wound.       Objective   Vital Signs:  /72 (BP Location: Left arm, Patient Position: Sitting, Cuff Size: Adult)   Pulse 61   Temp 98 °F (36.7 °C)   Ht 175.3 cm (69\")   Wt 111 kg (245 lb 12.8 oz)   SpO2 98%   BMI 36.30 kg/m²   Estimated body mass index is 36.3 kg/m² as calculated from the following:    Height as of this encounter: 175.3 cm (69\").    Weight as of this encounter: 111 kg (245 lb 12.8 oz).             Physical Exam  Constitutional:       General: He is not in acute distress.     Appearance: Normal appearance.   HENT:      Head: Normocephalic.      Right Ear: Tympanic membrane normal.      Left Ear: Tympanic membrane normal.      Nose: Nose normal.   Eyes:      Pupils: Pupils are equal, round, and reactive to light.   Cardiovascular:      Rate and Rhythm: Normal rate.      Pulses: Normal pulses.      Heart sounds: Normal heart sounds.   Pulmonary:      Effort: Pulmonary effort is normal.      Breath sounds: Normal breath sounds.   Abdominal:      General: Bowel sounds are normal.      Palpations: Abdomen is soft.   Musculoskeletal:         General: Normal range of motion.      Cervical back: Normal range of motion and neck supple.   Skin:     General: Skin is warm.      Comments: Swollen nasal cavity, follows wound j  Bandage applied to nasal area/forehead   Neurological:      General: No focal deficit " present.      Mental Status: He is alert and oriented to person, place, and time.   Psychiatric:         Mood and Affect: Mood normal.         Behavior: Behavior normal.         Thought Content: Thought content normal.         Judgment: Judgment normal.      Result Review :                   Assessment and Plan   Diagnoses and all orders for this visit:    1. Essential hypertension (Primary)  -     Comprehensive Metabolic Panel  -     Lipid Panel    2. Mixed hyperlipidemia  -     Lipid Panel    3. Paroxysmal atrial fibrillation  -     CBC & Differential  -     TSH    4. Encounter for medical examination to establish care    5. Annual physical exam  -     CBC & Differential  -     Comprehensive Metabolic Panel  -     Lipid Panel  -     TSH  -     PSA Screen    6. Screening PSA (prostate specific antigen)  -     PSA Screen    7. Open wound of nasal cavity with complication, subsequent encounter  Assessment & Plan:  Follows wound care; had surgery last Thursday      Orders:  -     CBC & Differential    8. Encounter to establish care      HTN is stable, takes medication daily. Disussed with patient the importance of maintaining a normal BMI.  Reviewed the Dash diet, dietary sodium restriction.  Encourage the patient to engage in 30 minutes of regular aerobic physical activity at least 3 days a week.  Limit alcohol consumption to no more than 2 drinks per day for men, 1 drink per day for women.  Patient voiced understanding all questions answered.    Discussed with patient to avoid fried fatty foods, eat a healthy well-balanced diet, frequent aerobic exercise, take medication as prescribed, and return to office for lab follow-up.    AFIB follows cardiology, stable, takes metoprolol for rate control    Counseling was provided on nutrition, physical activity, development, and injury prevention, dental health, and safe sex practices patient verbalizes understanding no additional questions were asked.         Follow Up    Return in about 6 months (around 1/26/2024) for Recheck.  Patient was given instructions and counseling regarding his condition or for health maintenance advice. Please see specific information pulled into the AVS if appropriate.

## 2023-07-27 PROBLEM — Z76.89 ENCOUNTER TO ESTABLISH CARE: Status: ACTIVE | Noted: 2023-07-26

## 2023-08-08 ENCOUNTER — OFFICE VISIT (OUTPATIENT)
Dept: WOUND CARE | Facility: HOSPITAL | Age: 58
End: 2023-08-08
Payer: COMMERCIAL

## 2023-08-08 PROCEDURE — G0463 HOSPITAL OUTPT CLINIC VISIT: HCPCS

## 2023-09-05 ENCOUNTER — OFFICE VISIT (OUTPATIENT)
Dept: WOUND CARE | Facility: HOSPITAL | Age: 58
End: 2023-09-05
Payer: COMMERCIAL

## 2023-09-05 PROCEDURE — G0463 HOSPITAL OUTPT CLINIC VISIT: HCPCS

## 2023-09-25 DIAGNOSIS — I10 ESSENTIAL HYPERTENSION: ICD-10-CM

## 2023-09-25 RX ORDER — ATORVASTATIN CALCIUM 20 MG/1
TABLET, FILM COATED ORAL
Qty: 90 TABLET | Refills: 2 | Status: SHIPPED | OUTPATIENT
Start: 2023-09-25

## 2023-09-25 RX ORDER — METOPROLOL TARTRATE 100 MG/1
100 TABLET ORAL 2 TIMES DAILY
Qty: 180 TABLET | Refills: 1 | Status: SHIPPED | OUTPATIENT
Start: 2023-09-25

## 2023-09-25 RX ORDER — AMLODIPINE BESYLATE 10 MG/1
10 TABLET ORAL DAILY
Qty: 90 TABLET | Refills: 1 | Status: SHIPPED | OUTPATIENT
Start: 2023-09-25

## 2023-09-25 RX ORDER — VALSARTAN 320 MG/1
320 TABLET ORAL DAILY
Qty: 90 TABLET | Refills: 1 | Status: SHIPPED | OUTPATIENT
Start: 2023-09-25

## 2023-09-26 ENCOUNTER — OFFICE VISIT (OUTPATIENT)
Dept: WOUND CARE | Facility: HOSPITAL | Age: 58
End: 2023-09-26
Payer: COMMERCIAL

## 2023-09-26 PROCEDURE — G0463 HOSPITAL OUTPT CLINIC VISIT: HCPCS

## 2023-09-29 RX ORDER — ASPIRIN 81 MG/1
81 TABLET ORAL DAILY
COMMUNITY

## 2023-09-29 NOTE — SIGNIFICANT NOTE
Education provided the Patient on the following:    - Nothing to Eat or Drink after MN the night before the procedure  -You will need to have someone drive you home after your Surgery and remain with you for 24 hours after the Procedure  - The date of your procedure, your are welcome to have one visitor at bedside or remain within 10-15 minutes of [facility]  -Please wear warm socks when you arrive for your Surgery  -Remove all jewelry and leave any valuables before arriving on the date of your procedure (all will have to be removed before leaving preop)  -You will need to arrive at 0630 on 10-2-23 for your Surgery  -Feel free to contact us at: [phone] with any additional questions/concerns

## 2023-09-30 RX ORDER — CEFAZOLIN SODIUM 2 G/100ML
2000 INJECTION, SOLUTION INTRAVENOUS EVERY 8 HOURS
Status: DISCONTINUED | OUTPATIENT
Start: 2023-10-02 | End: 2023-10-02 | Stop reason: HOSPADM

## 2023-09-30 NOTE — H&P
September 26, 2023     Chief Complaint  Information obtained from Patient  58 year old with open nasal wound s/p paramedian nasal forehead flap        History of Present Illness (HPI)  58-year-old male with long history of a nasal open wound follow soft tissue loss after a spider bite in the past, s/p paramedial nasal flap reconstruction. The patient had his flap during June of 2023 and is no returning for a staged defatting of the nasal tip flap.  He is doing well and his flap has no loss over the nasal tip.      PMHx: Cardiac and Vasculature, Paroxysmal atrial fibrillation, Essential hypertension, Mixed hyperlipidemia      Patient History  Information obtained from Patient, Chart.     Family History  Cancer - Mother, Heart Disease - Father, Hypertension - Father,  No family history of Diabetes, Hereditary Spherocytosis, Kidney Disease, Lung Disease, Seizures, Stroke, Thyroid Problems, Tuberculosis.     Social History  Everyday smoker a few cigarettes daily  Marital Status - , Alcohol Use - Moderate, Drug Use - No History, Caffeine Use - Rarely.     Medical History  Eyes  Denies history of Cataracts, Glaucoma, Optic Neuritis  Ear/Nose/Mouth/Throat  Denies history of Chronic sinus problems/congestion, Middle ear problems  Hematologic/Lymphatic  Denies history of Anemia, Hemophilia, Human Immunodeficiency Virus, Lymphedema, Sickle Cell Disease  Respiratory  Patient has history of Sleep Apnea  Denies history of Aspiration, Asthma, Chronic Obstructive Pulmonary Disease (COPD), Pneumothorax, Tuberculosis  Cardiovascular  Patient has history of Arrhythmia - afib, Hypertension  Denies history of Angina, Congestive Heart Failure, Coronary Artery Disease, Deep Vein Thrombosis, Hypotension, Myocardial Infarction, Peripheral Arterial Disease, Peripheral Venous Disease, Phlebitis, Vasculitis  Gastrointestinal  Denies history of Cirrhosis , Colitis, Crohn’s, Hepatitis A, Hepatitis B, Hepatitis C  Endocrine  Denies  history of Type I Diabetes, Type II Diabetes, Thyroid Disease, Hepatitis  Genitourinary  Denies history of End Stage Renal Disease  Immunological  Denies history of Lupus Erythematosus, Raynaud’s, Scleroderma  Integumentary (Skin)  Denies history of History of Burn  Musculoskeletal  Patient has history of Osteoarthritis  Denies history of Gout, Rheumatoid Arthritis, Osteomyelitis  Neurologic  Denies history of Dementia, Neuropathy, Quadriplegia, Paraplegia, Seizure Disorder  Oncologic  Denies history of Received Chemotherapy, Received Radiation  Psychiatric  Denies history of Anorexia/bulimia, Confinement Anxiety     Hospitalization/Surgery History - left knee replacement.      Constitutional  Vitals Time Taken: 11:16 AM, Height: 68 in, Weight: 242 lbs, BMI: 36.8, Temperature: 97.1 °F, Pulse: 64 bpm, Respiratory Rate: 16 breaths/min, Blood Pressure: 147/78 mmHg.      Eyes  pupils equal round and reactive to light and accommodation.    Ears, Nose, Mouth, and Throat  normal hearing at 5 feet forced whisper. Nasal flap is intact, the subcutaneous tissue is pink and viable..    Respiratory  normal breathing without difficulty. clear to auscultation bilaterally.    Cardiovascular  regular rate and rhythm, normal S1, S2,.    Neurological  cranial nerves 2-12 intact.    Psychiatric  this patient is able to make decisions and demonstrates good insight into disease process. Alert and Oriented x 4. good recall to recent and remote information. pleasant and cooperative, affect is full range and appropriate for the circumstances.      Integumentary (Hair, Skin)  Nasal flap: pink and healthy. Scar tissue over the forehead is softening .      Assessment  58 year old with nose wound s/p paramedial flap reconstruction . The patient is prepared for the stage procedure of flap thinning over the nasal tip, this will be perfomred with general anesthesia . The patient will have the forehead scar and nasal tip scar thinned and re inset.   NPO the night   No new labs or EKG needed for this procedure.     Johny Malone MD

## 2023-10-02 ENCOUNTER — ANESTHESIA (OUTPATIENT)
Dept: SURGERY | Facility: SURGERY CENTER | Age: 58
End: 2023-10-02
Payer: COMMERCIAL

## 2023-10-02 ENCOUNTER — HOSPITAL ENCOUNTER (OUTPATIENT)
Facility: SURGERY CENTER | Age: 58
Setting detail: HOSPITAL OUTPATIENT SURGERY
Discharge: HOME | End: 2023-10-02
Attending: SURGERY | Admitting: SURGERY
Payer: COMMERCIAL

## 2023-10-02 ENCOUNTER — ANESTHESIA EVENT (OUTPATIENT)
Dept: SURGERY | Facility: SURGERY CENTER | Age: 58
End: 2023-10-02
Payer: COMMERCIAL

## 2023-10-02 VITALS
RESPIRATION RATE: 16 BRPM | TEMPERATURE: 98.2 F | HEART RATE: 78 BPM | DIASTOLIC BLOOD PRESSURE: 76 MMHG | BODY MASS INDEX: 35.25 KG/M2 | SYSTOLIC BLOOD PRESSURE: 124 MMHG | OXYGEN SATURATION: 95 % | WEIGHT: 238 LBS | HEIGHT: 69 IN

## 2023-10-02 DIAGNOSIS — S01.20XD: Primary | ICD-10-CM

## 2023-10-02 PROCEDURE — 25010000002 PROPOFOL 200 MG/20ML EMULSION: Performed by: ANESTHESIOLOGY

## 2023-10-02 PROCEDURE — 25010000002 CEFAZOLIN IN DEXTROSE 2000 MG/ 100 ML SOLUTION: Performed by: SURGERY

## 2023-10-02 PROCEDURE — 25010000002 ONDANSETRON PER 1 MG: Performed by: ANESTHESIOLOGY

## 2023-10-02 PROCEDURE — 25010000002 PHENYLEPHRINE 10 MG/ML SOLUTION: Performed by: ANESTHESIOLOGY

## 2023-10-02 PROCEDURE — 0 LIDOCAINE 1 % SOLUTION: Performed by: ANESTHESIOLOGY

## 2023-10-02 PROCEDURE — 25010000002 FENTANYL CITRATE (PF) 50 MCG/ML SOLUTION: Performed by: ANESTHESIOLOGY

## 2023-10-02 PROCEDURE — 25010000002 DEXAMETHASONE SODIUM PHOSPHATE 20 MG/5ML SOLUTION: Performed by: ANESTHESIOLOGY

## 2023-10-02 RX ORDER — PROMETHAZINE HYDROCHLORIDE 12.5 MG/1
25 TABLET ORAL ONCE AS NEEDED
Status: DISCONTINUED | OUTPATIENT
Start: 2023-10-02 | End: 2023-10-02 | Stop reason: HOSPADM

## 2023-10-02 RX ORDER — FLUMAZENIL 0.1 MG/ML
0.2 INJECTION INTRAVENOUS AS NEEDED
Status: DISCONTINUED | OUTPATIENT
Start: 2023-10-02 | End: 2023-10-02 | Stop reason: HOSPADM

## 2023-10-02 RX ORDER — SODIUM CHLORIDE, SODIUM LACTATE, POTASSIUM CHLORIDE, CALCIUM CHLORIDE 600; 310; 30; 20 MG/100ML; MG/100ML; MG/100ML; MG/100ML
1000 INJECTION, SOLUTION INTRAVENOUS CONTINUOUS
Status: DISCONTINUED | OUTPATIENT
Start: 2023-10-02 | End: 2023-10-02 | Stop reason: HOSPADM

## 2023-10-02 RX ORDER — PHENYLEPHRINE HYDROCHLORIDE 10 MG/ML
INJECTION INTRAVENOUS AS NEEDED
Status: DISCONTINUED | OUTPATIENT
Start: 2023-10-02 | End: 2023-10-02 | Stop reason: SURG

## 2023-10-02 RX ORDER — LIDOCAINE HYDROCHLORIDE 10 MG/ML
INJECTION, SOLUTION INFILTRATION; PERINEURAL AS NEEDED
Status: DISCONTINUED | OUTPATIENT
Start: 2023-10-02 | End: 2023-10-02 | Stop reason: SURG

## 2023-10-02 RX ORDER — ONDANSETRON 2 MG/ML
INJECTION INTRAMUSCULAR; INTRAVENOUS AS NEEDED
Status: DISCONTINUED | OUTPATIENT
Start: 2023-10-02 | End: 2023-10-02 | Stop reason: SURG

## 2023-10-02 RX ORDER — HYDROCODONE BITARTRATE AND ACETAMINOPHEN 5; 325 MG/1; MG/1
1 TABLET ORAL ONCE
Status: COMPLETED | OUTPATIENT
Start: 2023-10-02 | End: 2023-10-02

## 2023-10-02 RX ORDER — FAMOTIDINE 10 MG/ML
20 INJECTION, SOLUTION INTRAVENOUS ONCE
Status: COMPLETED | OUTPATIENT
Start: 2023-10-02 | End: 2023-10-02

## 2023-10-02 RX ORDER — SODIUM CHLORIDE 0.9 % (FLUSH) 0.9 %
3 SYRINGE (ML) INJECTION EVERY 12 HOURS SCHEDULED
Status: DISCONTINUED | OUTPATIENT
Start: 2023-10-02 | End: 2023-10-02 | Stop reason: HOSPADM

## 2023-10-02 RX ORDER — FENTANYL CITRATE 50 UG/ML
25 INJECTION, SOLUTION INTRAMUSCULAR; INTRAVENOUS
Status: DISCONTINUED | OUTPATIENT
Start: 2023-10-02 | End: 2023-10-02 | Stop reason: HOSPADM

## 2023-10-02 RX ORDER — GINSENG 100 MG
CAPSULE ORAL AS NEEDED
Status: DISCONTINUED | OUTPATIENT
Start: 2023-10-02 | End: 2023-10-02 | Stop reason: HOSPADM

## 2023-10-02 RX ORDER — SODIUM CHLORIDE, SODIUM LACTATE, POTASSIUM CHLORIDE, CALCIUM CHLORIDE 600; 310; 30; 20 MG/100ML; MG/100ML; MG/100ML; MG/100ML
9 INJECTION, SOLUTION INTRAVENOUS CONTINUOUS
Status: DISCONTINUED | OUTPATIENT
Start: 2023-10-02 | End: 2023-10-02 | Stop reason: HOSPADM

## 2023-10-02 RX ORDER — PROPOFOL 10 MG/ML
INJECTION, EMULSION INTRAVENOUS AS NEEDED
Status: DISCONTINUED | OUTPATIENT
Start: 2023-10-02 | End: 2023-10-02 | Stop reason: SURG

## 2023-10-02 RX ORDER — MAGNESIUM HYDROXIDE 1200 MG/15ML
LIQUID ORAL AS NEEDED
Status: DISCONTINUED | OUTPATIENT
Start: 2023-10-02 | End: 2023-10-02 | Stop reason: HOSPADM

## 2023-10-02 RX ORDER — ONDANSETRON 2 MG/ML
4 INJECTION INTRAMUSCULAR; INTRAVENOUS ONCE AS NEEDED
Status: DISCONTINUED | OUTPATIENT
Start: 2023-10-02 | End: 2023-10-02 | Stop reason: HOSPADM

## 2023-10-02 RX ORDER — DIPHENHYDRAMINE HYDROCHLORIDE 50 MG/ML
12.5 INJECTION INTRAMUSCULAR; INTRAVENOUS
Status: DISCONTINUED | OUTPATIENT
Start: 2023-10-02 | End: 2023-10-02 | Stop reason: HOSPADM

## 2023-10-02 RX ORDER — NALOXONE HCL 0.4 MG/ML
0.2 VIAL (ML) INJECTION AS NEEDED
Status: DISCONTINUED | OUTPATIENT
Start: 2023-10-02 | End: 2023-10-02 | Stop reason: HOSPADM

## 2023-10-02 RX ORDER — SODIUM CHLORIDE 0.9 % (FLUSH) 0.9 %
10 SYRINGE (ML) INJECTION AS NEEDED
Status: DISCONTINUED | OUTPATIENT
Start: 2023-10-02 | End: 2023-10-02 | Stop reason: HOSPADM

## 2023-10-02 RX ORDER — PROMETHAZINE HYDROCHLORIDE 25 MG/1
25 SUPPOSITORY RECTAL ONCE AS NEEDED
Status: DISCONTINUED | OUTPATIENT
Start: 2023-10-02 | End: 2023-10-02 | Stop reason: HOSPADM

## 2023-10-02 RX ORDER — DROPERIDOL 2.5 MG/ML
0.62 INJECTION, SOLUTION INTRAMUSCULAR; INTRAVENOUS
Status: DISCONTINUED | OUTPATIENT
Start: 2023-10-02 | End: 2023-10-02 | Stop reason: HOSPADM

## 2023-10-02 RX ORDER — FENTANYL CITRATE 50 UG/ML
INJECTION, SOLUTION INTRAMUSCULAR; INTRAVENOUS AS NEEDED
Status: DISCONTINUED | OUTPATIENT
Start: 2023-10-02 | End: 2023-10-02 | Stop reason: SURG

## 2023-10-02 RX ORDER — MIDAZOLAM HYDROCHLORIDE 1 MG/ML
1 INJECTION INTRAMUSCULAR; INTRAVENOUS
Status: DISCONTINUED | OUTPATIENT
Start: 2023-10-02 | End: 2023-10-02 | Stop reason: HOSPADM

## 2023-10-02 RX ORDER — DEXAMETHASONE SODIUM PHOSPHATE 4 MG/ML
INJECTION, SOLUTION INTRA-ARTICULAR; INTRALESIONAL; INTRAMUSCULAR; INTRAVENOUS; SOFT TISSUE AS NEEDED
Status: DISCONTINUED | OUTPATIENT
Start: 2023-10-02 | End: 2023-10-02 | Stop reason: SURG

## 2023-10-02 RX ORDER — HYDROCODONE BITARTRATE AND ACETAMINOPHEN 5; 325 MG/1; MG/1
1 TABLET ORAL EVERY 6 HOURS PRN
Qty: 20 TABLET | Refills: 0 | Status: SHIPPED | OUTPATIENT
Start: 2023-10-02

## 2023-10-02 RX ORDER — BUPIVACAINE HYDROCHLORIDE AND EPINEPHRINE 2.5; 5 MG/ML; UG/ML
INJECTION, SOLUTION EPIDURAL; INFILTRATION; INTRACAUDAL; PERINEURAL AS NEEDED
Status: DISCONTINUED | OUTPATIENT
Start: 2023-10-02 | End: 2023-10-02 | Stop reason: HOSPADM

## 2023-10-02 RX ORDER — SODIUM CHLORIDE 0.9 % (FLUSH) 0.9 %
3-10 SYRINGE (ML) INJECTION AS NEEDED
Status: DISCONTINUED | OUTPATIENT
Start: 2023-10-02 | End: 2023-10-02 | Stop reason: HOSPADM

## 2023-10-02 RX ORDER — LIDOCAINE HYDROCHLORIDE 10 MG/ML
0.5 INJECTION, SOLUTION INFILTRATION; PERINEURAL ONCE AS NEEDED
Status: DISCONTINUED | OUTPATIENT
Start: 2023-10-02 | End: 2023-10-02 | Stop reason: HOSPADM

## 2023-10-02 RX ADMIN — FENTANYL CITRATE 25 MCG: 50 INJECTION, SOLUTION INTRAMUSCULAR; INTRAVENOUS at 08:15

## 2023-10-02 RX ADMIN — CEFAZOLIN SODIUM 2 G: 2 INJECTION, SOLUTION INTRAVENOUS at 08:11

## 2023-10-02 RX ADMIN — HYDROCODONE BITARTRATE AND ACETAMINOPHEN 1 TABLET: 5; 325 TABLET ORAL at 11:01

## 2023-10-02 RX ADMIN — DEXAMETHASONE SODIUM PHOSPHATE 8 MG: 4 INJECTION, SOLUTION INTRAMUSCULAR; INTRAVENOUS at 08:10

## 2023-10-02 RX ADMIN — ONDANSETRON 4 MG: 2 INJECTION INTRAMUSCULAR; INTRAVENOUS at 09:55

## 2023-10-02 RX ADMIN — PHENYLEPHRINE HYDROCHLORIDE 50 MCG: 10 INJECTION INTRAVENOUS at 08:15

## 2023-10-02 RX ADMIN — LIDOCAINE HYDROCHLORIDE 100 MG: 10 INJECTION, SOLUTION INFILTRATION; PERINEURAL at 08:05

## 2023-10-02 RX ADMIN — SODIUM CHLORIDE, POTASSIUM CHLORIDE, SODIUM LACTATE AND CALCIUM CHLORIDE 1000 ML: 600; 310; 30; 20 INJECTION, SOLUTION INTRAVENOUS at 06:42

## 2023-10-02 RX ADMIN — PROPOFOL 200 MG: 10 INJECTION, EMULSION INTRAVENOUS at 08:05

## 2023-10-02 RX ADMIN — FENTANYL CITRATE 25 MCG: 50 INJECTION, SOLUTION INTRAMUSCULAR; INTRAVENOUS at 09:14

## 2023-10-02 RX ADMIN — FAMOTIDINE 20 MG: 10 INJECTION INTRAVENOUS at 07:51

## 2023-10-02 NOTE — OP NOTE
Date:  10/02/2023    ADMITTING DIAGNOSIS:  1. Nasal tip flap acquired deformity     POST OP DIAGNOSIS:   1. Nasal tip flap acquired deformity       PROCEDURES PERFORMED:  Staged delayed paramedial nasal tip flap defatting   Staged nasal flap revision of scar and inset of flap 2.8 cm in length using a complex closure     SURGEON: JASON Malone M.D.     ANESTHESIA: General.      SPECIMENS: None.      DRAIN PLACEMENT: None     ESTIMATED BLOOD LOSS: Minimal     COMPLICATIONS: None apparent.       INDICATIONS: 58-year-old male with long history of a nasal open wound following a spider bite in the past. The patient is s/p a paramedial nasal forehead flap reconstruction of the nose during June of 2023.  The patient's nasal tip flap is ready for staged defatting procedure.  The entire flap cannot be defatted at once as this would jeopardize the flap, thus 1/2 of the flap will be defatted for this staged operative procedure. The patient understands the risks and benefits of the surgery and desires to proceed.       DESCRIPTION OF PROCEDURE:   The patient entered the operating room after receiving Ancef  2gm  IV in the holding suite.   The patient entered the operating room with SCDs in place over both legs. The patient was placed supine on the OR table, after intubation the forehead and nasal regions were prepped and draped in the usual sterile fashion with Betadine.  A time out was taken by the OR staff. The nasal flap was  marked with a sterile marker, and was infiltrated around the periphery with 0.25% Marcaine with epinephrine, 9 cc total.Attention was focused on the paramedial nasal flap over the nasal tip. The flap was incised over the left lateal border of the lower lateral nasal cartilage, the length of the incision was 2.8 cm. Up to one half the nasal flap was defatted, great care was taken not to injure the left lower lateral cartilage. The flap edges over the 2.8 cm border with scar tissue was incised and  freshened.  The flap was re-inset with 6-0 Nylon suture over the nasal tip, this complex closure was 2.8 cm in length.  The remaining flap could not be defatted as this wound disrupt the microcirculation to the flap.   At the completion of the procedure the sponge and needle counts were correct. The patient was extubated and  transferred to recovery in stable condition.  A non-stick dressing was placed over the nasal tip  Dr. Malone was present throughout the entire operative procedure.          JASON Malone M.D.

## 2023-10-02 NOTE — ANESTHESIA PREPROCEDURE EVALUATION
" Anesthesia Evaluation     Patient summary reviewed and Nursing notes reviewed   NPO Solid Status: > 8 hours  NPO Liquid Status: > 2 hours           Airway   Mallampati: II  TM distance: >3 FB  Neck ROM: full  Possible difficult intubation  Dental - normal exam     Pulmonary    (+) a smoker Current Smoked day of surgery,  Cardiovascular     ECG reviewed  Patient on routine beta blocker    (+) hypertension, dysrhythmias Atrial Fib, Paroxysmal Atrial Fib, hyperlipidemia      Neuro/Psych  GI/Hepatic/Renal/Endo    (+) obesity    Musculoskeletal     (+) neck pain (s/p fusion)  Abdominal    Substance History      OB/GYN          Other   arthritis,     ROS/Med Hx Other: Previous grade II view, bougie                Anesthesia Plan    ASA 3     general     (I have reviewed the patient's history and chart with the patient, including all pertinent laboratory results and imaging. I have explained the risks of anesthesia including but not limited to dental damage, corneal abrasion, nerve injury, MI, stroke, aspiration, and death. Patient has agreed to proceed.        BP (!) 142/102 (BP Location: Left arm)   Pulse 72   Temp 36.3 °C (97.3 °F) (Temporal)   Resp 20   Ht 175.3 cm (69\")   Wt 108 kg (238 lb)   SpO2 97%   BMI 35.15 kg/m²   )  intravenous induction     Anesthetic plan, risks, benefits, and alternatives have been provided, discussed and informed consent has been obtained with: patient.    CODE STATUS:         "

## 2023-10-02 NOTE — ANESTHESIA POSTPROCEDURE EVALUATION
Patient: Tre Lau    Procedure Summary       Date: 10/02/23 Room / Location: [facility]    Anesthesia Start: 0759 Anesthesia Stop: 1012    Procedure: DEFAT FLAP NASAL TIP WITH INSET (Nose) Diagnosis:       Acquired deformity of nose      (Acquired deformity of nose [M95.0])    Surgeons: Mike Malone MD Provider: Alba Nolasco MD    Anesthesia Type: general ASA Status: 3            Anesthesia Type: general    Vitals  Vitals Value Taken Time   /76 10/02/23 1039   Temp 36.8 °C (98.2 °F) 10/02/23 1039   Pulse 75 10/02/23 1039   Resp 16 10/02/23 1039   SpO2 95 % 10/02/23 1039           Post Anesthesia Care and Evaluation    Patient location during evaluation: bedside  Patient participation: complete - patient participated  Level of consciousness: awake and alert  Pain management: adequate    Airway patency: patent  Anesthetic complications: No anesthetic complications  PONV Status: controlled  Cardiovascular status: acceptable  Respiratory status: acceptable  Hydration status: acceptable

## 2023-10-02 NOTE — BRIEF OP NOTE
WOUND CLOSURE/REPAIR HEAD/NECK  Progress Note    Tre SOHAM Lau  10/2/2023    Pre-op Diagnosis:   Acquired deformity of nose [M95.0]  S/p paramedial nasal tip flap       Post-Op Diagnosis Codes:     * Acquired deformity of nose [M95.0]  S/p paramedial nasal tip flap    Procedure/CPT® Codes:      Procedure(s):  Delayed paramedial nasal tip flap defatting and re inset of flap      Surgeon(s):  Mike Malone MD    Anesthesia: General    Staff:   Circulator: Mandie Bautista RN  Scrub Person: Cait Riggins RN         Estimated Blood Loss: minimal    Urine Voided: * No values recorded between 10/2/2023  7:59 AM and 10/2/2023 10:07 AM *    Specimens:                None          Drains: * No LDAs found *    Findings: One half of the flap was defatted        Complications: None          Mike Malone MD     Date: 10/2/2023  Time: 10:07 EDT

## 2023-10-02 NOTE — ANESTHESIA PROCEDURE NOTES
Airway  Urgency: elective    Date/Time: 10/2/2023 8:07 AM  Airway not difficult    General Information and Staff    Patient location during procedure: OR  Anesthesiologist: Alba Nolasco MD    Indications and Patient Condition  Indications for airway management: airway protection    Preoxygenated: yes  Mask difficulty assessment: 1 - vent by mask    Final Airway Details  Final airway type: supraglottic airway      Successful airway: unique  Size 5  Cuff Pressure (cm H2O): 21  Airway Seal Pressure (cm H2O): 18     Number of attempts at approach: 1  Assessment: lips, teeth, and gum same as pre-op and atraumatic intubation

## 2023-10-10 ENCOUNTER — OFFICE VISIT (OUTPATIENT)
Dept: WOUND CARE | Facility: HOSPITAL | Age: 58
End: 2023-10-10
Payer: COMMERCIAL

## 2023-10-10 PROCEDURE — G0463 HOSPITAL OUTPT CLINIC VISIT: HCPCS

## 2023-10-24 ENCOUNTER — OFFICE VISIT (OUTPATIENT)
Dept: WOUND CARE | Facility: HOSPITAL | Age: 58
End: 2023-10-24
Payer: COMMERCIAL

## 2023-10-24 PROCEDURE — G0463 HOSPITAL OUTPT CLINIC VISIT: HCPCS

## 2023-11-21 ENCOUNTER — OFFICE VISIT (OUTPATIENT)
Dept: WOUND CARE | Facility: HOSPITAL | Age: 58
End: 2023-11-21
Payer: COMMERCIAL

## 2023-12-18 RX ORDER — CEFAZOLIN SODIUM 2 G/100ML
2000 INJECTION, SOLUTION INTRAVENOUS ONCE
Status: COMPLETED | OUTPATIENT
Start: 2023-12-18 | End: 2023-12-21

## 2023-12-18 NOTE — H&P
November 21, 2023     Chief Complaint  Information obtained from Patient  58 year old with open nasal wound s/p paramedian nasal forehead flap        History of Present Illness (HPI)  58-year-old male with long history of a nasal open wound follow soft tissue loss after a spider bite in the past, s/p paramedial nasal flap reconstruction. The patient had his flap during June of 2023  and has undergone one flap defatting procedure October 2, 2023.  The patient is now prepared for his second nasal flap delayed defatting procedure.  This is a planned delayed procedure to contour the nasal tip reconstruction.       PMHx: Cardiac and Vasculature, Paroxysmal atrial fibrillation, Essential hypertension, Mixed hyperlipidemia      Patient History  Information obtained from Patient, Chart.     Family History  Cancer - Mother, Heart Disease - Father, Hypertension - Father,  No family history of Diabetes, Hereditary Spherocytosis, Kidney Disease, Lung Disease, Seizures, Stroke, Thyroid Problems, Tuberculosis.     Social History  Everyday smoker a few cigarettes daily  Marital Status - , Alcohol Use - Moderate, Drug Use - No History, Caffeine Use - Rarely.     Medical History  Eyes  Denies history of Cataracts, Glaucoma, Optic Neuritis  Ear/Nose/Mouth/Throat  Denies history of Chronic sinus problems/congestion, Middle ear problems  Hematologic/Lymphatic  Denies history of Anemia, Hemophilia, Human Immunodeficiency Virus, Lymphedema, Sickle Cell Disease  Respiratory  Patient has history of Sleep Apnea  Denies history of Aspiration, Asthma, Chronic Obstructive Pulmonary Disease (COPD), Pneumothorax, Tuberculosis  Cardiovascular  Patient has history of Arrhythmia - afib, Hypertension  Denies history of Angina, Congestive Heart Failure, Coronary Artery Disease, Deep Vein Thrombosis, Hypotension, Myocardial Infarction, Peripheral Arterial Disease, Peripheral Venous Disease, Phlebitis, Vasculitis  Gastrointestinal  Denies  history of Cirrhosis , Colitis, Crohn’s, Hepatitis A, Hepatitis B, Hepatitis C  Endocrine  Denies history of Type I Diabetes, Type II Diabetes, Thyroid Disease, Hepatitis  Genitourinary  Denies history of End Stage Renal Disease  Immunological  Denies history of Lupus Erythematosus, Raynaud’s, Scleroderma  Integumentary (Skin)  Denies history of History of Burn  Musculoskeletal  Patient has history of Osteoarthritis  Denies history of Gout, Rheumatoid Arthritis, Osteomyelitis  Neurologic  Denies history of Dementia, Neuropathy, Quadriplegia, Paraplegia, Seizure Disorder  Oncologic  Denies history of Received Chemotherapy, Received Radiation  Psychiatric  Denies history of Anorexia/bulimia, Confinement Anxiety     Hospitalization/Surgery History - left knee replacement.      Constitutional  Vitals Time Taken: 11:16 AM, Height: 68 in, Weight: 242 lbs, BMI: 36.8, Temperature: 97.3 °F, Pulse: 69 bpm, Respiratory Rate: 16 breaths/min, Blood Pressure: 147/79 mmHg.        Eyes  pupils equal round and reactive to light and accommodation.     Ears, Nose, Mouth, and Throat  normal hearing at 5 feet forced whisper. Nasal flap is intact, the subcutaneous tissue is pink and viable..     Respiratory  normal breathing without difficulty. clear to auscultation bilaterally.     Cardiovascular  regular rate and rhythm, normal S1, S2,.     Neurological  cranial nerves 2-12 intact.     Psychiatric  this patient is able to make decisions and demonstrates good insight into disease process. Alert and Oriented x 4. good recall to recent and remote information. pleasant and cooperative, affect is full range and appropriate for the circumstances.        Integumentary (Hair, Skin)  Nasal flap: pink and healthy. Scar over the forehead from previous surgery.      The nasal tip has a viable flap with 1/2 previously defatted.  The tip is pink and healthy.      Assessment  58 year old with nose wound s/p paramedial flap reconstruction . The  patient is prepared for the second planned defatting staged procedure of the nasal tip.   This is the second nasal tip defatting procedure as part of the patient's planned reconstruction.  The procedure will be  performed with general anesthesia . The patient was warned of the risks involved with smoking or tobacco use as this may resulted in delayed healing over the nasal tip.  NPO the night before the procedure.     No new labs or EKG needed for this procedure.      Johny Malone MD

## 2023-12-19 ENCOUNTER — OFFICE VISIT (OUTPATIENT)
Dept: WOUND CARE | Facility: HOSPITAL | Age: 58
End: 2023-12-19
Payer: COMMERCIAL

## 2023-12-19 PROCEDURE — G0463 HOSPITAL OUTPT CLINIC VISIT: HCPCS

## 2023-12-21 ENCOUNTER — HOSPITAL ENCOUNTER (OUTPATIENT)
Facility: SURGERY CENTER | Age: 58
Setting detail: HOSPITAL OUTPATIENT SURGERY
Discharge: HOME | End: 2023-12-21
Attending: SURGERY | Admitting: SURGERY
Payer: COMMERCIAL

## 2023-12-21 ENCOUNTER — ANESTHESIA EVENT (OUTPATIENT)
Dept: SURGERY | Facility: SURGERY CENTER | Age: 58
End: 2023-12-21
Payer: COMMERCIAL

## 2023-12-21 ENCOUNTER — ANESTHESIA (OUTPATIENT)
Dept: SURGERY | Facility: SURGERY CENTER | Age: 58
End: 2023-12-21
Payer: COMMERCIAL

## 2023-12-21 VITALS
DIASTOLIC BLOOD PRESSURE: 74 MMHG | WEIGHT: 246 LBS | OXYGEN SATURATION: 94 % | RESPIRATION RATE: 16 BRPM | HEIGHT: 69 IN | BODY MASS INDEX: 36.43 KG/M2 | SYSTOLIC BLOOD PRESSURE: 126 MMHG | TEMPERATURE: 98.7 F | HEART RATE: 77 BPM

## 2023-12-21 DIAGNOSIS — S01.20XA: Primary | ICD-10-CM

## 2023-12-21 PROCEDURE — 25010000002 FENTANYL CITRATE (PF) 100 MCG/2ML SOLUTION: Performed by: ANESTHESIOLOGY

## 2023-12-21 PROCEDURE — 25010000002 PROPOFOL 10 MG/ML EMULSION: Performed by: ANESTHESIOLOGY

## 2023-12-21 PROCEDURE — 25010000002 SUGAMMADEX 200 MG/2ML SOLUTION: Performed by: ANESTHESIOLOGY

## 2023-12-21 PROCEDURE — 25010000002 DEXAMETHASONE PER 1 MG: Performed by: ANESTHESIOLOGY

## 2023-12-21 PROCEDURE — 25010000002 ONDANSETRON PER 1 MG: Performed by: ANESTHESIOLOGY

## 2023-12-21 PROCEDURE — 25010000002 CEFAZOLIN IN DEXTROSE 2000 MG/ 100 ML SOLUTION: Performed by: SURGERY

## 2023-12-21 PROCEDURE — 25810000003 LACTATED RINGERS PER 1000 ML: Performed by: SURGERY

## 2023-12-21 RX ORDER — SODIUM CHLORIDE 0.9 % (FLUSH) 0.9 %
10 SYRINGE (ML) INJECTION AS NEEDED
Status: DISCONTINUED | OUTPATIENT
Start: 2023-12-21 | End: 2023-12-21 | Stop reason: HOSPADM

## 2023-12-21 RX ORDER — LIDOCAINE HYDROCHLORIDE 10 MG/ML
0.5 INJECTION, SOLUTION INFILTRATION; PERINEURAL ONCE AS NEEDED
Status: DISCONTINUED | OUTPATIENT
Start: 2023-12-21 | End: 2023-12-21 | Stop reason: HOSPADM

## 2023-12-21 RX ORDER — CELECOXIB 50 MG/1
100 CAPSULE ORAL ONCE
Status: DISCONTINUED | OUTPATIENT
Start: 2023-12-21 | End: 2023-12-21

## 2023-12-21 RX ORDER — FAMOTIDINE 10 MG/ML
20 INJECTION, SOLUTION INTRAVENOUS ONCE
Status: DISCONTINUED | OUTPATIENT
Start: 2023-12-21 | End: 2023-12-21 | Stop reason: HOSPADM

## 2023-12-21 RX ORDER — FLUMAZENIL 0.1 MG/ML
0.2 INJECTION INTRAVENOUS AS NEEDED
Status: DISCONTINUED | OUTPATIENT
Start: 2023-12-21 | End: 2023-12-21 | Stop reason: HOSPADM

## 2023-12-21 RX ORDER — NALOXONE HCL 0.4 MG/ML
0.2 VIAL (ML) INJECTION AS NEEDED
Status: DISCONTINUED | OUTPATIENT
Start: 2023-12-21 | End: 2023-12-21 | Stop reason: HOSPADM

## 2023-12-21 RX ORDER — ROCURONIUM BROMIDE 10 MG/ML
INJECTION, SOLUTION INTRAVENOUS AS NEEDED
Status: DISCONTINUED | OUTPATIENT
Start: 2023-12-21 | End: 2023-12-21 | Stop reason: SURG

## 2023-12-21 RX ORDER — GINSENG 100 MG
CAPSULE ORAL AS NEEDED
Status: DISCONTINUED | OUTPATIENT
Start: 2023-12-21 | End: 2023-12-21 | Stop reason: HOSPADM

## 2023-12-21 RX ORDER — SODIUM CHLORIDE, SODIUM LACTATE, POTASSIUM CHLORIDE, CALCIUM CHLORIDE 600; 310; 30; 20 MG/100ML; MG/100ML; MG/100ML; MG/100ML
9 INJECTION, SOLUTION INTRAVENOUS CONTINUOUS
Status: DISCONTINUED | OUTPATIENT
Start: 2023-12-21 | End: 2023-12-21 | Stop reason: HOSPADM

## 2023-12-21 RX ORDER — FAMOTIDINE 10 MG/ML
20 INJECTION, SOLUTION INTRAVENOUS
Status: COMPLETED | OUTPATIENT
Start: 2023-12-21 | End: 2023-12-21

## 2023-12-21 RX ORDER — FENTANYL CITRATE 50 UG/ML
INJECTION, SOLUTION INTRAMUSCULAR; INTRAVENOUS AS NEEDED
Status: DISCONTINUED | OUTPATIENT
Start: 2023-12-21 | End: 2023-12-21 | Stop reason: SURG

## 2023-12-21 RX ORDER — CEPHALEXIN 500 MG/1
500 CAPSULE ORAL 2 TIMES DAILY
Qty: 6 CAPSULE | Refills: 0 | Status: SHIPPED | OUTPATIENT
Start: 2023-12-21

## 2023-12-21 RX ORDER — SODIUM CHLORIDE, SODIUM LACTATE, POTASSIUM CHLORIDE, CALCIUM CHLORIDE 600; 310; 30; 20 MG/100ML; MG/100ML; MG/100ML; MG/100ML
1000 INJECTION, SOLUTION INTRAVENOUS CONTINUOUS
Status: DISCONTINUED | OUTPATIENT
Start: 2023-12-21 | End: 2023-12-21 | Stop reason: HOSPADM

## 2023-12-21 RX ORDER — PROPOFOL 10 MG/ML
VIAL (ML) INTRAVENOUS AS NEEDED
Status: DISCONTINUED | OUTPATIENT
Start: 2023-12-21 | End: 2023-12-21 | Stop reason: SURG

## 2023-12-21 RX ORDER — SODIUM CHLORIDE 0.9 % (FLUSH) 0.9 %
3-10 SYRINGE (ML) INJECTION AS NEEDED
Status: DISCONTINUED | OUTPATIENT
Start: 2023-12-21 | End: 2023-12-21 | Stop reason: HOSPADM

## 2023-12-21 RX ORDER — PROMETHAZINE HYDROCHLORIDE 12.5 MG/1
25 TABLET ORAL ONCE AS NEEDED
Status: DISCONTINUED | OUTPATIENT
Start: 2023-12-21 | End: 2023-12-21 | Stop reason: HOSPADM

## 2023-12-21 RX ORDER — DIPHENHYDRAMINE HYDROCHLORIDE 50 MG/ML
12.5 INJECTION INTRAMUSCULAR; INTRAVENOUS
Status: DISCONTINUED | OUTPATIENT
Start: 2023-12-21 | End: 2023-12-21 | Stop reason: HOSPADM

## 2023-12-21 RX ORDER — MAGNESIUM HYDROXIDE 1200 MG/15ML
LIQUID ORAL AS NEEDED
Status: DISCONTINUED | OUTPATIENT
Start: 2023-12-21 | End: 2023-12-21 | Stop reason: HOSPADM

## 2023-12-21 RX ORDER — ONDANSETRON 2 MG/ML
4 INJECTION INTRAMUSCULAR; INTRAVENOUS ONCE AS NEEDED
Status: DISCONTINUED | OUTPATIENT
Start: 2023-12-21 | End: 2023-12-21 | Stop reason: HOSPADM

## 2023-12-21 RX ORDER — LIDOCAINE HYDROCHLORIDE 20 MG/ML
INJECTION, SOLUTION EPIDURAL; INFILTRATION; INTRACAUDAL; PERINEURAL AS NEEDED
Status: DISCONTINUED | OUTPATIENT
Start: 2023-12-21 | End: 2023-12-21 | Stop reason: SURG

## 2023-12-21 RX ORDER — ONDANSETRON 2 MG/ML
INJECTION INTRAMUSCULAR; INTRAVENOUS AS NEEDED
Status: DISCONTINUED | OUTPATIENT
Start: 2023-12-21 | End: 2023-12-21 | Stop reason: SURG

## 2023-12-21 RX ORDER — BUPIVACAINE HYDROCHLORIDE AND EPINEPHRINE 2.5; 5 MG/ML; UG/ML
INJECTION, SOLUTION EPIDURAL; INFILTRATION; INTRACAUDAL; PERINEURAL AS NEEDED
Status: DISCONTINUED | OUTPATIENT
Start: 2023-12-21 | End: 2023-12-21 | Stop reason: HOSPADM

## 2023-12-21 RX ORDER — MIDAZOLAM HYDROCHLORIDE 1 MG/ML
1 INJECTION INTRAMUSCULAR; INTRAVENOUS
Status: DISCONTINUED | OUTPATIENT
Start: 2023-12-21 | End: 2023-12-21 | Stop reason: HOSPADM

## 2023-12-21 RX ORDER — PROMETHAZINE HYDROCHLORIDE 25 MG/1
25 SUPPOSITORY RECTAL ONCE AS NEEDED
Status: DISCONTINUED | OUTPATIENT
Start: 2023-12-21 | End: 2023-12-21 | Stop reason: HOSPADM

## 2023-12-21 RX ORDER — SODIUM CHLORIDE 9 MG/ML
40 INJECTION, SOLUTION INTRAVENOUS AS NEEDED
Status: DISCONTINUED | OUTPATIENT
Start: 2023-12-21 | End: 2023-12-21 | Stop reason: HOSPADM

## 2023-12-21 RX ORDER — FENTANYL CITRATE 50 UG/ML
50 INJECTION, SOLUTION INTRAMUSCULAR; INTRAVENOUS
Status: DISCONTINUED | OUTPATIENT
Start: 2023-12-21 | End: 2023-12-21 | Stop reason: HOSPADM

## 2023-12-21 RX ORDER — EPHEDRINE SULFATE 50 MG/ML
INJECTION INTRAVENOUS AS NEEDED
Status: DISCONTINUED | OUTPATIENT
Start: 2023-12-21 | End: 2023-12-21 | Stop reason: SURG

## 2023-12-21 RX ORDER — ACETAMINOPHEN 500 MG
1000 TABLET ORAL ONCE
Status: COMPLETED | OUTPATIENT
Start: 2023-12-21 | End: 2023-12-21

## 2023-12-21 RX ORDER — DROPERIDOL 2.5 MG/ML
0.62 INJECTION, SOLUTION INTRAMUSCULAR; INTRAVENOUS
Status: DISCONTINUED | OUTPATIENT
Start: 2023-12-21 | End: 2023-12-21 | Stop reason: HOSPADM

## 2023-12-21 RX ORDER — FAMOTIDINE 10 MG
20 TABLET ORAL
Status: COMPLETED | OUTPATIENT
Start: 2023-12-21 | End: 2023-12-21

## 2023-12-21 RX ORDER — DEXAMETHASONE SODIUM PHOSPHATE 4 MG/ML
INJECTION, SOLUTION INTRA-ARTICULAR; INTRALESIONAL; INTRAMUSCULAR; INTRAVENOUS; SOFT TISSUE AS NEEDED
Status: DISCONTINUED | OUTPATIENT
Start: 2023-12-21 | End: 2023-12-21 | Stop reason: SURG

## 2023-12-21 RX ORDER — SODIUM CHLORIDE 0.9 % (FLUSH) 0.9 %
3 SYRINGE (ML) INJECTION EVERY 12 HOURS SCHEDULED
Status: DISCONTINUED | OUTPATIENT
Start: 2023-12-21 | End: 2023-12-21 | Stop reason: HOSPADM

## 2023-12-21 RX ORDER — HYDROCODONE BITARTRATE AND ACETAMINOPHEN 5; 325 MG/1; MG/1
1 TABLET ORAL EVERY 6 HOURS PRN
Qty: 20 TABLET | Refills: 0 | Status: SHIPPED | OUTPATIENT
Start: 2023-12-21 | End: 2023-12-26

## 2023-12-21 RX ADMIN — SODIUM CHLORIDE, POTASSIUM CHLORIDE, SODIUM LACTATE AND CALCIUM CHLORIDE 1000 ML: 600; 310; 30; 20 INJECTION, SOLUTION INTRAVENOUS at 07:07

## 2023-12-21 RX ADMIN — FENTANYL CITRATE 100 MCG: 50 INJECTION, SOLUTION INTRAMUSCULAR; INTRAVENOUS at 09:10

## 2023-12-21 RX ADMIN — ONDANSETRON 4 MG: 2 INJECTION INTRAMUSCULAR; INTRAVENOUS at 11:30

## 2023-12-21 RX ADMIN — FAMOTIDINE 20 MG: 10 INJECTION, SOLUTION INTRAVENOUS at 07:08

## 2023-12-21 RX ADMIN — LIDOCAINE HYDROCHLORIDE 100 MG: 20 INJECTION, SOLUTION EPIDURAL; INFILTRATION; INTRACAUDAL; PERINEURAL at 09:10

## 2023-12-21 RX ADMIN — ROCURONIUM BROMIDE 50 MG: 10 INJECTION, SOLUTION INTRAVENOUS at 09:10

## 2023-12-21 RX ADMIN — CEFAZOLIN SODIUM 2 G: 2 INJECTION, SOLUTION INTRAVENOUS at 09:02

## 2023-12-21 RX ADMIN — DEXAMETHASONE SODIUM PHOSPHATE 8 MG: 4 INJECTION, SOLUTION INTRAMUSCULAR; INTRAVENOUS at 09:31

## 2023-12-21 RX ADMIN — EPHEDRINE SULFATE 10 MG: 50 INJECTION, SOLUTION INTRAVENOUS at 10:35

## 2023-12-21 RX ADMIN — ACETAMINOPHEN 1000 MG: 500 TABLET ORAL at 08:54

## 2023-12-21 RX ADMIN — SUGAMMADEX 200 MG: 100 INJECTION, SOLUTION INTRAVENOUS at 11:31

## 2023-12-21 RX ADMIN — ROCURONIUM BROMIDE 20 MG: 10 INJECTION, SOLUTION INTRAVENOUS at 09:14

## 2023-12-21 RX ADMIN — PROPOFOL 200 MG: 10 INJECTION, EMULSION INTRAVENOUS at 09:10

## 2023-12-21 NOTE — INTERVAL H&P NOTE
H&P reviewed. The patient was examined and there are no changes to the H&P.    The patient was NPO after midnight.

## 2023-12-21 NOTE — BRIEF OP NOTE
WOUND CLOSURE/REPAIR HEAD/NECK  Progress Note    Tre L Judi  12/21/2023    Pre-op Diagnosis:   Acquired deformity of nose [M95.0]       Post-Op Diagnosis Codes:     * Acquired deformity of nose [M95.0]    Procedure/CPT® Codes:        Procedure(s):  Staged delayed paramedial nasal tip flap defatting over right sided nasal tip.  Staged nasal flap revision of scar and inset of flap 2.5 cm in length using a complex closure  Scar revision over the left lateral nasal side wall 1.0 cm in length complex closure      Surgeon(s):  Mike Malone MD    Anesthesia: General    Staff:   Circulator: Mandie Bautista RN  Scrub Person: Cait Riggins RN         Estimated Blood Loss: minimal    Urine Voided: * No values recorded between 12/21/2023  8:59 AM and 12/21/2023 11:35 AM *    Specimens:                None          Drains: * No LDAs found *    Findings: None        Complications: None          Mike Malone MD     Date: 12/21/2023  Time: 11:36 EST

## 2023-12-21 NOTE — OP NOTE
Date:  12/21/2023     ADMITTING DIAGNOSIS:  1. Nasal tip flap acquired deformity     POST OP DIAGNOSIS:   1. Nasal tip flap acquired deformity       PROCEDURES PERFORMED:  Staged delayed paramedial nasal tip flap defatting over right sided nasal tip.  Staged nasal flap revision of scar and inset of flap 2.5 cm in length using a complex closure  Scar revision over the left lateral nasal side wall 1.0 cm in length complex closure     SURGEON: JASON Malone M.D.     ANESTHESIA: General.      SPECIMENS: None      DRAIN PLACEMENT: None     ESTIMATED BLOOD LOSS: Minimal     COMPLICATIONS: None apparent.       INDICATIONS: 58-year-old male with long history of a nasal open wound following a spider bite in the past. The patient is s/p a paramedial nasal forehead flap reconstruction of the nose during June of 2023.  The patient's nasal tip flap is ready for staged defatting procedure.  The left side of the flap was defatted  October 2023 and the right side of the flap is no ready for staged defatting of the flap this month. The patient has an ulcer over the left side of the nasal flap reconstruction over the left nasal sidewall which will also be revised. The patient understands the risks and benefits of the surgery and desires to proceed.       DESCRIPTION OF PROCEDURE:   The patient entered the operating room after receiving Ancef  2gm  IV in the holding suite.   The patient entered the operating room with SCDs in place over both legs. The patient was placed supine on the OR table, after intubation the nasal regions were prepped and draped in the usual sterile fashion with Betadine.  A time out was taken by the OR staff. The nasal flap was  marked with a sterile marker, and was infiltrated around the periphery with 0.25% Marcaine with epinephrine, 8 cc total. Attention was focused on the paramedial nasal flap over the nasal tip. There was a left lateral flap defect over the border of the left nasal sidewall, this  region would require scar revision over the left side of the nasal sidewall.  The defect over the left lateral nasal flap side wall was resected  with a full thickness incision over the left lateral sidewall region 10 mm in length.  A 10 blade was used to resect the flap border over the sidewall, the lateal maxillary nasal sidewall region was undermined to provide more tissue for the side wall region,  the area was then washed with saline and The left lateral side wall was then moved medially toward the nasal tip flap and was then repaired in a complex fashion with with interrupted 6-0 Monocryl and 6-0 Nylon, the repair was 1.0 cm length and repaired in a dual layered fashion. Next attention was focused over the right lateral nasal region and tip.  This was the focus of defatting over the tip flap.   The flap was incised over the right lateal border of the lower lateral nasal cartilage, the length of the incision was 2.5 cm. Up to one half the nasal flap was defatted, great care was taken not to injure the left lower lateral cartilage. The flap edges over the 2.5 cm border with scar tissue was incised and freshened.  The flap was re-inset with 6-0 Monocryl and 6-0  Nylon suture over the nasal tip, this complex closure was 2.5 cm in length.  At the completion of the procedure the sponge and needle counts were correct. The patient was extubated and  transferred to recovery in stable condition.  A non-stick dressing was placed over the nasal tip  Dr. Malone was present throughout the entire operative procedure.          JASON Malone M.D.

## 2023-12-21 NOTE — ANESTHESIA PROCEDURE NOTES
Airway  Urgency: elective    Date/Time: 12/21/2023 9:12 AM  Airway not difficult    General Information and Staff    Patient location during procedure: OR  Anesthesiologist: Carson Bergman MD    Indications and Patient Condition  Indications for airway management: airway protection    Preoxygenated: yes  MILS maintained throughout  Mask difficulty assessment: 2 - vent by mask + OA or adjuvant +/- NMBA    Final Airway Details  Final airway type: endotracheal airway      Successful airway: ETT  Cuffed: yes   Successful intubation technique: direct laryngoscopy  Endotracheal tube insertion site: oral  Blade: Fara  Blade size: 3  ETT size (mm): 8.5  Cormack-Lehane Classification: grade I - full view of glottis  Placement verified by: chest auscultation and capnometry   Measured from: lips  ETT/EBT  to lips (cm): 23  Number of attempts at approach: 1  Assessment: lips, teeth, and gum same as pre-op and atraumatic intubation            
No

## 2023-12-21 NOTE — ANESTHESIA POSTPROCEDURE EVALUATION
Patient: Tre Lau    Procedure Summary       Date: 12/21/23 Room / Location: [facility]    Anesthesia Start: 0859 Anesthesia Stop: 1145    Procedure: DEFAT NASAL FLAP (Bilateral: Nose) Diagnosis:       Acquired deformity of nose      (Acquired deformity of nose [M95.0])    Surgeons: Mike Malone MD Provider: Carson Bergman MD    Anesthesia Type: general ASA Status: 3            Anesthesia Type: general    Vitals  Vitals Value Taken Time   /74 12/21/23 1210   Temp 37.1 °C (98.7 °F) 12/21/23 1140   Pulse 74 12/21/23 1210   Resp 16 12/21/23 1210   SpO2 94 % 12/21/23 1210           Post Anesthesia Care and Evaluation    Patient location during evaluation: bedside  Patient participation: complete - patient participated  Level of consciousness: awake  Pain management: adequate    Airway patency: patent  Anesthetic complications: No anesthetic complications  PONV Status: none  Cardiovascular status: acceptable  Respiratory status: acceptable  Hydration status: acceptable  Post Neuraxial Block status: Motor and sensory function returned to baseline

## 2023-12-21 NOTE — ANESTHESIA PREPROCEDURE EVALUATION
Anesthesia Evaluation     Patient summary reviewed   NPO Solid Status: > 8 hours  NPO Liquid Status: > 2 hours           Airway   Mallampati: I  TM distance: >3 FB  Neck ROM: full  No difficulty expected  Dental - normal exam     Pulmonary - normal exam   Cardiovascular - normal exam  Exercise tolerance: good (4-7 METS)    (+) hypertension, dysrhythmias Atrial Fib, hyperlipidemia      Neuro/Psych  GI/Hepatic/Renal/Endo      Musculoskeletal     Abdominal  - normal exam    Bowel sounds: normal.   Substance History      OB/GYN          Other                    Anesthesia Plan    ASA 3     general     intravenous induction     Anesthetic plan, risks, benefits, and alternatives have been provided, discussed and informed consent has been obtained with: patient.  Pre-procedure education provided    CODE STATUS:

## 2024-01-09 ENCOUNTER — OFFICE VISIT (OUTPATIENT)
Dept: WOUND CARE | Facility: HOSPITAL | Age: 59
End: 2024-01-09
Payer: COMMERCIAL

## 2024-01-09 PROCEDURE — G0463 HOSPITAL OUTPT CLINIC VISIT: HCPCS

## 2024-02-06 ENCOUNTER — OFFICE VISIT (OUTPATIENT)
Dept: WOUND CARE | Facility: HOSPITAL | Age: 59
End: 2024-02-06
Payer: COMMERCIAL

## 2024-03-23 DIAGNOSIS — I10 ESSENTIAL HYPERTENSION: ICD-10-CM

## 2024-03-25 RX ORDER — AMLODIPINE BESYLATE 10 MG/1
10 TABLET ORAL DAILY
Qty: 90 TABLET | Refills: 0 | Status: SHIPPED | OUTPATIENT
Start: 2024-03-25

## 2024-03-25 RX ORDER — METOPROLOL TARTRATE 100 MG/1
100 TABLET ORAL 2 TIMES DAILY
Qty: 90 TABLET | Refills: 0 | Status: SHIPPED | OUTPATIENT
Start: 2024-03-25

## 2024-04-05 DIAGNOSIS — I10 ESSENTIAL HYPERTENSION: ICD-10-CM

## 2024-04-05 RX ORDER — VALSARTAN 320 MG/1
320 TABLET ORAL DAILY
Qty: 90 TABLET | Refills: 1 | Status: SHIPPED | OUTPATIENT
Start: 2024-04-05

## 2024-04-05 NOTE — TELEPHONE ENCOUNTER
Caller: Tre Lau    Relationship: Self    Best call back number: [phone]    Requested Prescriptions:   Requested Prescriptions     Pending Prescriptions Disp Refills    valsartan (DIOVAN) 320 MG tablet 90 tablet 1     Sig: Take 1 tablet by mouth Daily.        Pharm[facility] be sent[id number] [place], [place] - [address] AT [facility] [facility] [phone] [facility] [phone] FX     Last office visit with prescribing clinician: 7/26/2023   Last telemedicine visit with prescribing clinician: Visit date not found   Next office visit with prescribing clinician: 5/13/2024    Additional details provided by patient: PATIENT STATES HE HAS 3 DAYS LEFT ON HIS CURRENT PRESCRIPTION.     Would you like a call back once the refill request has been completed: [x] Yes [] No    If the office needs to give you a call back, can they leave a voicemail: [x] Yes [] No    Alisha Becker, PCT   04/05/24 12:13 EDT

## 2024-05-13 ENCOUNTER — OFFICE VISIT (OUTPATIENT)
Dept: FAMILY MEDICINE CLINIC | Facility: CLINIC | Age: 59
End: 2024-05-13
Payer: COMMERCIAL

## 2024-05-13 VITALS
TEMPERATURE: 98.2 F | DIASTOLIC BLOOD PRESSURE: 70 MMHG | SYSTOLIC BLOOD PRESSURE: 124 MMHG | HEART RATE: 65 BPM | BODY MASS INDEX: 37.56 KG/M2 | WEIGHT: 253.6 LBS | HEIGHT: 69 IN | OXYGEN SATURATION: 98 %

## 2024-05-13 DIAGNOSIS — Z12.11 SCREEN FOR COLON CANCER: ICD-10-CM

## 2024-05-13 DIAGNOSIS — E78.2 MIXED HYPERLIPIDEMIA: ICD-10-CM

## 2024-05-13 DIAGNOSIS — I48.0 PAROXYSMAL ATRIAL FIBRILLATION: ICD-10-CM

## 2024-05-13 DIAGNOSIS — I10 ESSENTIAL HYPERTENSION: Primary | ICD-10-CM

## 2024-05-13 PROCEDURE — 99214 OFFICE O/P EST MOD 30 MIN: CPT | Performed by: NURSE PRACTITIONER

## 2024-05-13 NOTE — PROGRESS NOTES
"Chief Complaint  Hypertension (Follow up/120//80s at home/Not fasting//) and Medication Problem (Can he go off Aspirin?/Causing bruising and hematomas. )    Subjective        Tre Lau presents to [facility]  History of Present Illness  Patient presents office today for follow-up on hypertension.  Patient take amlodipine/valsartan, metoprolol 100 mg twice daily.  Blood pressure is 124/70.  He is stable denies chest pain shortness of air.  With atrial fibrillation following cardiology taking aspirin therapy.  With hyperlipidemia taking statin therapy.                                          Objective   Vital Signs:  /70 (BP Location: Left arm, Patient Position: Sitting, Cuff Size: Large Adult)   Pulse 65   Temp 98.2 °F (36.8 °C)   Ht 175.3 cm (69.02\")   Wt 115 kg (253 lb 9.6 oz)   SpO2 98%   BMI 37.43 kg/m²   Estimated body mass index is 37.43 kg/m² as calculated from the following:    Height as of this encounter: 175.3 cm (69.02\").    Weight as of this encounter: 115 kg (253 lb 9.6 oz).               Physical Exam  Constitutional:       General: He is not in acute distress.     Appearance: Normal appearance.   HENT:      Head: Normocephalic.   Eyes:      Pupils: Pupils are equal, round, and reactive to light.   Cardiovascular:      Rate and Rhythm: Normal rate.      Pulses: Normal pulses.      Heart sounds: Normal heart sounds.   Pulmonary:      Effort: Pulmonary effort is normal.      Breath sounds: Normal breath sounds.   Musculoskeletal:         General: Normal range of motion.      Cervical back: Normal range of motion and neck supple.   Skin:     General: Skin is warm.   Neurological:      General: No focal deficit present.      Mental Status: He is alert and oriented to person, place, and time.   Psychiatric:         Mood and Affect: Mood normal.         Behavior: Behavior normal.         Thought Content: Thought content normal.         Judgment: Judgment " normal.        Result Review :    The following data was reviewed by: RYAN Moreira on 05/13/2024:  Common labs          7/26/2023    10:45   Common Labs   Glucose 103    BUN 13    Creatinine 0.98    Sodium 138    Potassium 5.4    Chloride 103    Calcium 9.5    Total Protein 6.7    Albumin 4.4    Total Bilirubin 0.3    Alkaline Phosphatase 121    AST (SGOT) 18    ALT (SGPT) 50    WBC 10.84    Hemoglobin 13.6    Hematocrit 40.5    Platelets 253    Total Cholesterol 161    Triglycerides 182    HDL Cholesterol 45    LDL Cholesterol  85    PSA 2.900                   Assessment and Plan     Diagnoses and all orders for this visit:    1. Essential hypertension (Primary)  Assessment & Plan:  Hypertension is stable and controlled  Continue current treatment regimen.  Blood pressure will be reassessed in 6 months.      2. Screen for colon cancer  -     Ambulatory Referral For Screening Colonoscopy    3. Mixed hyperlipidemia  Assessment & Plan:   Lipid abnormalities are stable    Plan:  Continue same medication/s without change.      Discussed medication dosage, use, side effects, and goals of treatment in detail.    Counseled patient on lifestyle modifications to help control hyperlipidemia.     Patient Treatment Goals:   LDL goal is less than 70    Followup in 6 months.      4. Paroxysmal atrial fibrillation    Atrial fibrillation stable taking aspirin       I spent 15 minutes caring for Tre on this date of service. This time includes time spent by me in the following activities:preparing for the visit, reviewing tests, obtaining and/or reviewing a separately obtained history, performing a medically appropriate examination and/or evaluation , counseling and educating the patient/family/caregiver, ordering medications, tests, or procedures, documenting information in the medical record, independently interpreting results and communicating that information with the patient/family/caregiver, and care  coordination  Follow Up     Return in about 3 months (around 8/13/2024) for Annual physical.  Patient was given instructions and counseling regarding his condition or for health maintenance advice. Please see specific information pulled into the AVS if appropriate.

## 2024-05-21 ENCOUNTER — LAB (OUTPATIENT)
Dept: LAB | Facility: HOSPITAL | Age: 59
End: 2024-05-21
Payer: COMMERCIAL

## 2024-05-21 ENCOUNTER — TRANSCRIBE ORDERS (OUTPATIENT)
Dept: LAB | Facility: HOSPITAL | Age: 59
End: 2024-05-21
Payer: COMMERCIAL

## 2024-05-21 ENCOUNTER — OFFICE VISIT (OUTPATIENT)
Dept: WOUND CARE | Facility: HOSPITAL | Age: 59
End: 2024-05-21
Payer: COMMERCIAL

## 2024-05-21 DIAGNOSIS — S01.20XD: Primary | ICD-10-CM

## 2024-05-21 DIAGNOSIS — S01.20XD: ICD-10-CM

## 2024-05-21 LAB
ANION GAP SERPL CALCULATED.3IONS-SCNC: 11.8 MMOL/L (ref 5–15)
BUN SERPL-MCNC: 12 MG/DL (ref 6–20)
BUN/CREAT SERPL: 12.4 (ref 7–25)
CALCIUM SPEC-SCNC: 8.9 MG/DL (ref 8.6–10.5)
CHLORIDE SERPL-SCNC: 107 MMOL/L (ref 98–107)
CO2 SERPL-SCNC: 23.2 MMOL/L (ref 22–29)
CREAT SERPL-MCNC: 0.97 MG/DL (ref 0.76–1.27)
DEPRECATED RDW RBC AUTO: 42.8 FL (ref 37–54)
EGFRCR SERPLBLD CKD-EPI 2021: 90.5 ML/MIN/1.73
ERYTHROCYTE [DISTWIDTH] IN BLOOD BY AUTOMATED COUNT: 12.6 % (ref 12.3–15.4)
GLUCOSE SERPL-MCNC: 96 MG/DL (ref 65–99)
HCT VFR BLD AUTO: 43.8 % (ref 37.5–51)
HGB BLD-MCNC: 14.4 G/DL (ref 13–17.7)
MCH RBC QN AUTO: 30.3 PG (ref 26.6–33)
MCHC RBC AUTO-ENTMCNC: 32.9 G/DL (ref 31.5–35.7)
MCV RBC AUTO: 92.2 FL (ref 79–97)
PLATELET # BLD AUTO: 251 10*3/MM3 (ref 140–450)
PMV BLD AUTO: 11 FL (ref 6–12)
POTASSIUM SERPL-SCNC: 4.5 MMOL/L (ref 3.5–5.2)
RBC # BLD AUTO: 4.75 10*6/MM3 (ref 4.14–5.8)
SODIUM SERPL-SCNC: 142 MMOL/L (ref 136–145)
WBC NRBC COR # BLD AUTO: 9.6 10*3/MM3 (ref 3.4–10.8)

## 2024-05-21 PROCEDURE — 80048 BASIC METABOLIC PNL TOTAL CA: CPT

## 2024-05-21 PROCEDURE — 85027 COMPLETE CBC AUTOMATED: CPT

## 2024-05-21 PROCEDURE — 36415 COLL VENOUS BLD VENIPUNCTURE: CPT

## 2024-05-26 NOTE — H&P
Chief Complaint  Information obtained from Patient  58 year old with nasal flap and  left nasal sidewall ulcer    History of Present Illness (HPI)  58-year-old male with long history of a nasal scar follow soft tissue loss after a spider bite. The patient has chronic scarring over the nasal bridge with ulcer development over the edges perhaps from his mask or glasses. The patient was seen in the wound care center and begin on Betadine dressing care to the nasal defect which represents loss of the nasal soft tissue over the tip of the nose and alar rims. The patient is s/p a forehead nasal flap to reconstruct the nasal tip June of 2023.  The patient has a region of the lateral sidewall that has an ulcer, this will need to be revised over the left lateral aspect of the flap.  The patient also would like the tip to be defatted to help with aesthetics of the flap.       PMHx: Cardiac and Vasculature, Paroxysmal atrial fibrillation, Essential hypertension, Mixed hyperlipidemia    5/21/2024 The patient returns to the wound care center for follow up. He has one region over the nasal region that has not healed properly with an open pit. The patient no longer smokes, he has quit. He is ready for revision of the nasal flap to correct the let sided ulcer over the nasal sidewall and de fat the flap.      Patient History  Information obtained from Patient, Chart.    Allergies  No Known Allergies      Family History  Cancer - Mother, Heart Disease - Father, Hypertension - Father,  No family history of Diabetes, Hereditary Spherocytosis, Kidney Disease, Lung Disease, Seizures, Stroke, Thyroid Problems, Tuberculosis.    Social History  Former smoker, Marital Status - , Alcohol Use - Moderate, Drug Use - No History, Caffeine Use - Daily.    Medical History  Eyes  Denies history of Cataracts, Glaucoma, Optic Neuritis  Ear/Nose/Mouth/Throat  Denies history of Chronic sinus problems/congestion, Middle ear  problems  Hematologic/Lymphatic  Denies history of Anemia, Hemophilia, Human Immunodeficiency Virus, Lymphedema, Sickle Cell Disease  Respiratory  Patient has history of Sleep Apnea  Denies history of Aspiration, Asthma, Chronic Obstructive Pulmonary Disease (COPD), Pneumothorax, Tuberculosis  Cardiovascular  Patient has history of Arrhythmia - afib, Hypertension  Denies history of Angina, Congestive Heart Failure, Coronary Artery Disease, Deep Vein Thrombosis, Hypotension, Myocardial Infarction, Peripheral Arterial Disease, Peripheral Venous Disease, Phlebitis, Vasculitis  Gastrointestinal  Denies history of Cirrhosis , Colitis, Crohn’s, Hepatitis A, Hepatitis B, Hepatitis C  Endocrine  Denies history of Type I Diabetes, Type II Diabetes, Thyroid Disease, Hepatitis  Genitourinary  Denies history of End Stage Renal Disease  Immunological  Denies history of Lupus Erythematosus, Raynaud’s, Scleroderma  Integumentary (Skin)  Denies history of History of Burn  Musculoskeletal  Patient has history of Osteoarthritis  Denies history of Gout, Rheumatoid Arthritis, Osteomyelitis  Neurologic  Denies history of Dementia, Neuropathy, Quadriplegia, Paraplegia, Seizure Disorder  Oncologic  Denies history of Received Chemotherapy, Received Radiation  Psychiatric  Denies history of Anorexia/bulimia, Confinement Anxiety    Hospitalization/Surgery History - left knee replacement. - 4 nose surgeries.    Medical And Surgical History Notes  Ear/Nose/Mouth/Throat  spider bite to nose, multiple surgeries, surgery schedulesfor5/30/2    Review of Systems (ROS)  Constitutional Symptoms (General Health)  Denies complaints or symptoms of Fatigue, Fever, Chills, Marked Weight Change.  Eyes  Denies complaints or symptoms of Dry Eyes, Vision Changes, Glasses / Contacts.  Gastrointestinal  Denies complaints or symptoms of Frequent diarrhea, Nausea, Vomiting.  Endocrine  Denies complaints or symptoms of Heat/cold intolerance.  Genitourinary  Denies  complaints or symptoms of Frequent urination.  Integumentary (Skin)  Denies complaints or symptoms of Wounds.  Neurologic  Denies complaints or symptoms of Numbness/parasthesias.  Psychiatric  Denies complaints or symptoms of Claustrophobia.      Medications  Lipitor 20 mg tablet oral tablet oral  valsartan 320 mg tablet oral tablet oral  metoprolol tartrate 100 mg tablet oral tablet oral  Norvasc 10 mg tablet oral tablet oral  Adult Low Dose Aspirin 81 mg tablet,delayed release oral tablet,delayed release (DR/EC) oral          Objective    Constitutional  Vitals Time Taken: 11:24 AM, Height: 70 in, Weight: 253 lbs, BMI: 36.3, Temperature: 97.5 °F, Pulse: 66 bpm, Respiratory Rate: 18 breaths/min, Blood Pressure: 138/88 mmHg.      Eyes  pupils equal round and reactive to light and accommodation.    Ears, Nose, Mouth, and Throat  normal hearing at 5 feet forced whisper.    Respiratory  normal breathing without difficulty. clear to auscultation bilaterally.    Cardiovascular  regular rate and rhythm, normal S1, S2,.    Gastrointestinal (GI)  soft, non-tender, non-distended, +BS.    Psychiatric  Alert and Oriented x 4. good recall to recent and remote information. pleasant and cooperative, affect is full range and appropriate for the circumstances.        Integumentary (Hair, Skin)  Nasal flap: right side of the nasal bridge (sidewall) there is an open wound . No erythema present . Left nasal sidewall flap elevated from soft tissue flap.. For complete descriptions of all wounds, see following section on detailed wound assessments..    Wound #5 status is Open. Original cause of wound was Surgical Injury. The date acquired was: 5/21/2024. The wound is currently classified as a Unclassifiable wound with etiology of To be determined and is located on the Nose. The wound measures 0.1cm length x 0.1cm width x 0.1cm depth; 0.008cm^2 area and 0.001cm^3 volume. There is a none present amount of drainage noted. There is no  granulation within the wound bed. There is no necrotic tissue within the wound bed. The periwound skin appearance exhibited: Scarring. Periwound temperature was noted as No Abnormality.      Assessment  The patient has delayed healing over the leftnasal side wall, this will need to be re inset to help the reconstructed region healing properly . The nasal soft tissue flap  may be defatted and re inset to help the area lay more evenly.   The patient will need routine labs.  He has chest pain or shortness of breath.  No new medical issues since his last procedure.      Plan    Follow-up Appointments:  Return Appointment in 2 weeks. - June 11th 10:15  Dressing Change Frequency:  Wound #5 Nose:    Change dressing every day.  Skin Barriers/Mayi-Wound Care:  Wound #5 Nose:    Moisturizing lotion  [facility] Contact Information:  [facility] Main Number: [phone]  Laboratory ordered were:  CBC (hemogram), Basic Metabolic panel CMS      Electronic Signature(s)  Signed: 5/21/2024 12:31:22 PM By: Johny Malone MD  Previous Signature: 5/21/2024 11:59:38 AM Version By: Johny Malone MD    Entered By: Johny Malone on 05/21/2024 12:31:21

## 2024-05-30 ENCOUNTER — HOSPITAL ENCOUNTER (OUTPATIENT)
Facility: SURGERY CENTER | Age: 59
Setting detail: HOSPITAL OUTPATIENT SURGERY
Discharge: HOME | End: 2024-05-30
Attending: SURGERY | Admitting: SURGERY
Payer: COMMERCIAL

## 2024-05-30 ENCOUNTER — ANESTHESIA (OUTPATIENT)
Dept: SURGERY | Facility: SURGERY CENTER | Age: 59
End: 2024-05-30
Payer: COMMERCIAL

## 2024-05-30 ENCOUNTER — ANESTHESIA EVENT (OUTPATIENT)
Dept: SURGERY | Facility: SURGERY CENTER | Age: 59
End: 2024-05-30
Payer: COMMERCIAL

## 2024-05-30 VITALS
WEIGHT: 252.4 LBS | RESPIRATION RATE: 16 BRPM | SYSTOLIC BLOOD PRESSURE: 145 MMHG | TEMPERATURE: 97.8 F | BODY MASS INDEX: 37.26 KG/M2 | HEART RATE: 71 BPM | OXYGEN SATURATION: 93 % | DIASTOLIC BLOOD PRESSURE: 103 MMHG

## 2024-05-30 DIAGNOSIS — S01.20XD: Primary | ICD-10-CM

## 2024-05-30 PROCEDURE — 25810000003 LACTATED RINGERS PER 1000 ML: Performed by: SURGERY

## 2024-05-30 PROCEDURE — 25010000002 FENTANYL CITRATE (PF) 50 MCG/ML SOLUTION: Performed by: ANESTHESIOLOGY

## 2024-05-30 PROCEDURE — 25010000002 CEFAZOLIN IN DEXTROSE 2000 MG/ 100 ML SOLUTION: Performed by: SURGERY

## 2024-05-30 PROCEDURE — 11442 EXC FACE-MM B9+MARG 1.1-2 CM: CPT | Performed by: SURGERY

## 2024-05-30 PROCEDURE — 25010000002 DEXAMETHASONE SODIUM PHOSPHATE 20 MG/5ML SOLUTION: Performed by: ANESTHESIOLOGY

## 2024-05-30 PROCEDURE — 25010000002 DROPERIDOL PER 5 MG: Performed by: ANESTHESIOLOGY

## 2024-05-30 PROCEDURE — 25010000002 SUGAMMADEX 200 MG/2ML SOLUTION: Performed by: ANESTHESIOLOGY

## 2024-05-30 PROCEDURE — 25010000002 PROPOFOL 200 MG/20ML EMULSION: Performed by: ANESTHESIOLOGY

## 2024-05-30 RX ORDER — LIDOCAINE HYDROCHLORIDE 20 MG/ML
INJECTION, SOLUTION EPIDURAL; INFILTRATION; INTRACAUDAL; PERINEURAL AS NEEDED
Status: DISCONTINUED | OUTPATIENT
Start: 2024-05-30 | End: 2024-05-30 | Stop reason: SURG

## 2024-05-30 RX ORDER — SODIUM CHLORIDE 0.9 % (FLUSH) 0.9 %
10 SYRINGE (ML) INJECTION AS NEEDED
Status: DISCONTINUED | OUTPATIENT
Start: 2024-05-30 | End: 2024-05-30 | Stop reason: HOSPADM

## 2024-05-30 RX ORDER — FENTANYL CITRATE 50 UG/ML
50 INJECTION, SOLUTION INTRAMUSCULAR; INTRAVENOUS ONCE AS NEEDED
Status: DISCONTINUED | OUTPATIENT
Start: 2024-05-30 | End: 2024-05-30 | Stop reason: HOSPADM

## 2024-05-30 RX ORDER — DROPERIDOL 2.5 MG/ML
0.62 INJECTION, SOLUTION INTRAMUSCULAR; INTRAVENOUS
Status: DISCONTINUED | OUTPATIENT
Start: 2024-05-30 | End: 2024-05-30 | Stop reason: HOSPADM

## 2024-05-30 RX ORDER — LIDOCAINE HYDROCHLORIDE 10 MG/ML
0.5 INJECTION, SOLUTION INFILTRATION; PERINEURAL ONCE AS NEEDED
Status: DISCONTINUED | OUTPATIENT
Start: 2024-05-30 | End: 2024-05-30 | Stop reason: HOSPADM

## 2024-05-30 RX ORDER — NALOXONE HCL 0.4 MG/ML
0.2 VIAL (ML) INJECTION AS NEEDED
Status: DISCONTINUED | OUTPATIENT
Start: 2024-05-30 | End: 2024-05-30 | Stop reason: HOSPADM

## 2024-05-30 RX ORDER — FLUMAZENIL 0.1 MG/ML
0.2 INJECTION INTRAVENOUS AS NEEDED
Status: DISCONTINUED | OUTPATIENT
Start: 2024-05-30 | End: 2024-05-30 | Stop reason: HOSPADM

## 2024-05-30 RX ORDER — HYDROCODONE BITARTRATE AND ACETAMINOPHEN 5; 325 MG/1; MG/1
2 TABLET ORAL EVERY 6 HOURS PRN
Qty: 15 TABLET | Refills: 0 | Status: SHIPPED | OUTPATIENT
Start: 2024-05-30

## 2024-05-30 RX ORDER — CEFAZOLIN SODIUM 2 G/100ML
2000 INJECTION, SOLUTION INTRAVENOUS EVERY 8 HOURS
Status: COMPLETED | OUTPATIENT
Start: 2024-05-30 | End: 2024-05-30

## 2024-05-30 RX ORDER — FAMOTIDINE 10 MG/ML
20 INJECTION, SOLUTION INTRAVENOUS ONCE
Status: DISCONTINUED | OUTPATIENT
Start: 2024-05-30 | End: 2024-05-30 | Stop reason: HOSPADM

## 2024-05-30 RX ORDER — HYDROCODONE BITARTRATE AND ACETAMINOPHEN 5; 325 MG/1; MG/1
1 TABLET ORAL ONCE
Status: COMPLETED | OUTPATIENT
Start: 2024-05-30 | End: 2024-05-30

## 2024-05-30 RX ORDER — MAGNESIUM HYDROXIDE 1200 MG/15ML
LIQUID ORAL AS NEEDED
Status: DISCONTINUED | OUTPATIENT
Start: 2024-05-30 | End: 2024-05-30 | Stop reason: HOSPADM

## 2024-05-30 RX ORDER — SODIUM CHLORIDE, SODIUM LACTATE, POTASSIUM CHLORIDE, CALCIUM CHLORIDE 600; 310; 30; 20 MG/100ML; MG/100ML; MG/100ML; MG/100ML
9 INJECTION, SOLUTION INTRAVENOUS CONTINUOUS
Status: DISCONTINUED | OUTPATIENT
Start: 2024-05-30 | End: 2024-05-30 | Stop reason: HOSPADM

## 2024-05-30 RX ORDER — BUPIVACAINE HYDROCHLORIDE AND EPINEPHRINE 5; 5 MG/ML; UG/ML
INJECTION, SOLUTION PERINEURAL AS NEEDED
Status: DISCONTINUED | OUTPATIENT
Start: 2024-05-30 | End: 2024-05-30 | Stop reason: HOSPADM

## 2024-05-30 RX ORDER — FENTANYL CITRATE 0.05 MG/ML
INJECTION, SOLUTION INTRAMUSCULAR; INTRAVENOUS AS NEEDED
Status: DISCONTINUED | OUTPATIENT
Start: 2024-05-30 | End: 2024-05-30 | Stop reason: SURG

## 2024-05-30 RX ORDER — FAMOTIDINE 10 MG
20 TABLET ORAL
Status: COMPLETED | OUTPATIENT
Start: 2024-05-30 | End: 2024-05-30

## 2024-05-30 RX ORDER — PROMETHAZINE HYDROCHLORIDE 12.5 MG/1
25 TABLET ORAL ONCE AS NEEDED
Status: DISCONTINUED | OUTPATIENT
Start: 2024-05-30 | End: 2024-05-30 | Stop reason: HOSPADM

## 2024-05-30 RX ORDER — FAMOTIDINE 10 MG/ML
20 INJECTION, SOLUTION INTRAVENOUS
Status: COMPLETED | OUTPATIENT
Start: 2024-05-30 | End: 2024-05-30

## 2024-05-30 RX ORDER — SODIUM CHLORIDE 0.9 % (FLUSH) 0.9 %
3 SYRINGE (ML) INJECTION EVERY 12 HOURS SCHEDULED
Status: DISCONTINUED | OUTPATIENT
Start: 2024-05-30 | End: 2024-05-30 | Stop reason: HOSPADM

## 2024-05-30 RX ORDER — DIPHENHYDRAMINE HYDROCHLORIDE 50 MG/ML
12.5 INJECTION INTRAMUSCULAR; INTRAVENOUS
Status: DISCONTINUED | OUTPATIENT
Start: 2024-05-30 | End: 2024-05-30 | Stop reason: HOSPADM

## 2024-05-30 RX ORDER — DEXAMETHASONE SODIUM PHOSPHATE 4 MG/ML
INJECTION, SOLUTION INTRA-ARTICULAR; INTRALESIONAL; INTRAMUSCULAR; INTRAVENOUS; SOFT TISSUE AS NEEDED
Status: DISCONTINUED | OUTPATIENT
Start: 2024-05-30 | End: 2024-05-30 | Stop reason: SURG

## 2024-05-30 RX ORDER — SODIUM CHLORIDE 0.9 % (FLUSH) 0.9 %
3-10 SYRINGE (ML) INJECTION AS NEEDED
Status: DISCONTINUED | OUTPATIENT
Start: 2024-05-30 | End: 2024-05-30 | Stop reason: HOSPADM

## 2024-05-30 RX ORDER — ROCURONIUM BROMIDE 10 MG/ML
INJECTION, SOLUTION INTRAVENOUS AS NEEDED
Status: DISCONTINUED | OUTPATIENT
Start: 2024-05-30 | End: 2024-05-30 | Stop reason: SURG

## 2024-05-30 RX ORDER — FENTANYL CITRATE 50 UG/ML
25 INJECTION, SOLUTION INTRAMUSCULAR; INTRAVENOUS
Status: DISCONTINUED | OUTPATIENT
Start: 2024-05-30 | End: 2024-05-30 | Stop reason: HOSPADM

## 2024-05-30 RX ORDER — SODIUM CHLORIDE, SODIUM LACTATE, POTASSIUM CHLORIDE, CALCIUM CHLORIDE 600; 310; 30; 20 MG/100ML; MG/100ML; MG/100ML; MG/100ML
1000 INJECTION, SOLUTION INTRAVENOUS CONTINUOUS
Status: DISCONTINUED | OUTPATIENT
Start: 2024-05-30 | End: 2024-05-30 | Stop reason: HOSPADM

## 2024-05-30 RX ORDER — PHENYLEPHRINE HCL IN 0.9% NACL 1 MG/10 ML
SYRINGE (ML) INTRAVENOUS AS NEEDED
Status: DISCONTINUED | OUTPATIENT
Start: 2024-05-30 | End: 2024-05-30 | Stop reason: SURG

## 2024-05-30 RX ORDER — MIDAZOLAM HYDROCHLORIDE 1 MG/ML
1 INJECTION INTRAMUSCULAR; INTRAVENOUS
Status: DISCONTINUED | OUTPATIENT
Start: 2024-05-30 | End: 2024-05-30 | Stop reason: HOSPADM

## 2024-05-30 RX ORDER — PROMETHAZINE HYDROCHLORIDE 25 MG/1
25 SUPPOSITORY RECTAL ONCE AS NEEDED
Status: DISCONTINUED | OUTPATIENT
Start: 2024-05-30 | End: 2024-05-30 | Stop reason: HOSPADM

## 2024-05-30 RX ORDER — AMLODIPINE BESYLATE 5 MG/1
5 TABLET ORAL DAILY
Qty: 30 TABLET | Refills: 11 | Status: SHIPPED | OUTPATIENT
Start: 2024-05-30 | End: 2025-05-30

## 2024-05-30 RX ORDER — PROPOFOL 10 MG/ML
INJECTION, EMULSION INTRAVENOUS AS NEEDED
Status: DISCONTINUED | OUTPATIENT
Start: 2024-05-30 | End: 2024-05-30 | Stop reason: SURG

## 2024-05-30 RX ORDER — ERYTHROMYCIN 5 MG/G
OINTMENT OPHTHALMIC AS NEEDED
Status: DISCONTINUED | OUTPATIENT
Start: 2024-05-30 | End: 2024-05-30 | Stop reason: HOSPADM

## 2024-05-30 RX ORDER — CEPHALEXIN 500 MG/1
500 CAPSULE ORAL 2 TIMES DAILY
Qty: 10 CAPSULE | Refills: 0 | Status: SHIPPED | OUTPATIENT
Start: 2024-05-30 | End: 2024-06-04

## 2024-05-30 RX ORDER — ONDANSETRON 2 MG/ML
4 INJECTION INTRAMUSCULAR; INTRAVENOUS ONCE AS NEEDED
Status: DISCONTINUED | OUTPATIENT
Start: 2024-05-30 | End: 2024-05-30 | Stop reason: HOSPADM

## 2024-05-30 RX ADMIN — PROPOFOL 40 MG: 10 INJECTION, EMULSION INTRAVENOUS at 08:53

## 2024-05-30 RX ADMIN — HYDROCODONE BITARTRATE AND ACETAMINOPHEN 1 TABLET: 5; 325 TABLET ORAL at 10:30

## 2024-05-30 RX ADMIN — Medication 100 MCG: at 08:26

## 2024-05-30 RX ADMIN — PROPOFOL 200 MG: 10 INJECTION, EMULSION INTRAVENOUS at 07:59

## 2024-05-30 RX ADMIN — Medication 150 MCG: at 08:31

## 2024-05-30 RX ADMIN — FENTANYL CITRATE 25 MCG: 50 INJECTION INTRAMUSCULAR; INTRAVENOUS at 10:00

## 2024-05-30 RX ADMIN — LIDOCAINE HYDROCHLORIDE 100 MG: 20 INJECTION, SOLUTION EPIDURAL; INFILTRATION; INTRACAUDAL; PERINEURAL at 07:59

## 2024-05-30 RX ADMIN — SODIUM CHLORIDE, POTASSIUM CHLORIDE, SODIUM LACTATE AND CALCIUM CHLORIDE: 600; 310; 30; 20 INJECTION, SOLUTION INTRAVENOUS at 09:37

## 2024-05-30 RX ADMIN — DROPERIDOL 1.25 MG: 2.5 INJECTION, SOLUTION INTRAMUSCULAR; INTRAVENOUS at 07:56

## 2024-05-30 RX ADMIN — CEFAZOLIN SODIUM 2000 MG: 2 INJECTION, SOLUTION INTRAVENOUS at 08:06

## 2024-05-30 RX ADMIN — SODIUM CHLORIDE, POTASSIUM CHLORIDE, SODIUM LACTATE AND CALCIUM CHLORIDE 1000 ML: 600; 310; 30; 20 INJECTION, SOLUTION INTRAVENOUS at 07:04

## 2024-05-30 RX ADMIN — FAMOTIDINE 20 MG: 10 INJECTION, SOLUTION INTRAVENOUS at 07:04

## 2024-05-30 RX ADMIN — SUGAMMADEX 200 MG: 100 INJECTION, SOLUTION INTRAVENOUS at 09:42

## 2024-05-30 RX ADMIN — DEXAMETHASONE SODIUM PHOSPHATE 8 MG: 4 INJECTION, SOLUTION INTRAMUSCULAR; INTRAVENOUS at 08:02

## 2024-05-30 RX ADMIN — FENTANYL CITRATE 25 MCG: 0.05 INJECTION, SOLUTION INTRAMUSCULAR; INTRAVENOUS at 08:21

## 2024-05-30 RX ADMIN — ROCURONIUM BROMIDE 50 MG: 50 INJECTION INTRAVENOUS at 08:00

## 2024-05-30 RX ADMIN — FENTANYL CITRATE 50 MCG: 0.05 INJECTION, SOLUTION INTRAMUSCULAR; INTRAVENOUS at 09:38

## 2024-05-30 RX ADMIN — FENTANYL CITRATE 25 MCG: 0.05 INJECTION, SOLUTION INTRAMUSCULAR; INTRAVENOUS at 08:05

## 2024-05-30 RX ADMIN — ROCURONIUM BROMIDE 20 MG: 50 INJECTION INTRAVENOUS at 08:54

## 2024-05-30 NOTE — OP NOTE
Date:  5/30/2024     ADMITTING DIAGNOSIS:  1. Nasal tip flap acquired deformity  2. Open nasal sidewall wound     POST OP DIAGNOSIS:   1. Nasal tip flap acquired deformity  2. Open nasal sidewall wound       PROCEDURES PERFORMED:  Staged delayed paramedial nasal tip flap defatting over right side nasal tip with closure 1.0 cm  Left nasal side wall revision of scar and inset of flap /closure 1.5 cm  in length      SURGEON: JASON Malone M.D.     ANESTHESIA: General.      SPECIMENS: None      DRAIN PLACEMENT: None     ESTIMATED BLOOD LOSS: Minimal     COMPLICATIONS: None apparent.       INDICATIONS: 58-year-old male with long history of a nasal scar follow soft tissue loss after a spider bite. The patient is s/p a forehead nasal flap to reconstruct the nasal tip June of 2023.  The patient has a region of the left lateral sidewall that has an ulcer, this will need to be revised over the left lateral aspect of the flap.  The patient also would like the tip to be defatted to help with aesthetics of the flap over the right side wall region.   The patient understands the risks and benefits of the surgery and desires to proceed.       DESCRIPTION OF PROCEDURE:   The patient entered the operating room after receiving Ancef  2gm  IV in the holding suite.   The patient entered the operating room with SCDs in place over both legs. The patient was placed supine on the OR table, after intubation the nasal regions were prepped and draped in the usual sterile fashion with Betadine.  A time out was taken by the OR staff. The nasal flap was  marked with a sterile marker, and was infiltrated around the periphery with 0.25% Marcaine with epinephrine, 2 cc total. Attention was focused on the paramedial nasal flap over the nasal tip.    There was a left lateral flap defect over the border of the left nasal sidewall with an open wound, this region would require scar revision over the left side of the nasal sidewall.  The defect over  the left lateral nasal flap side wall was resected  with a full thickness incision over the left lateral sidewall region 15 mm in length. Over the left lateal nasal sidewall viable skin and subcutaneous tissue were excised full thickness, this debridement was 15 mm x 2 mm and included viable skin and subcutaneous tissue with scar tissue.  A 15 blade was used to resect the flap border over the sidewall, the lateal maxillary nasal sidewall region was undermined to provide more tissue for the side wall region,  the area was then washed with saline and the left lateral side wall was then moved medially toward the nasal tip flap and was then repaired in a complex fashion with with interrupted 5-0 Nylon and 6-0 Nylon, the repair was 1.5 cm length and repaired in a dual layered fashion. Next attention was focused over the right lateral nasal region and tip.  This was the focus of defatting over the tip flap. The flap was incised over the right lateal border of the lower lateral nasal cartilage, the length of the incision was 1 cm. The right lateral nasal sidewall flap was defatted, great care was taken not to injure the left lower lateral cartilage. The flap edges over the 1 cm border with scar tissue was incised and freshened.  The flap was re-inset with 5-0 Nylon and 6-0 Nylon suture over the nasal tip, this complex closure was 1 cm in length.  At the completion of the procedure the sponge and needle counts were correct. The patient was extubated and  transferred to recovery in stable condition.  Erythromycin ointment wasp laced on the  op site.  Dr. Malone was present throughout the entire operative procedure.          JASON Malone M.D.

## 2024-05-30 NOTE — ANESTHESIA PREPROCEDURE EVALUATION
Anesthesia Evaluation     Patient summary reviewed   NPO Solid Status: > 8 hours  NPO Liquid Status: > 2 hours           Airway   Mallampati: II  TM distance: >3 FB  Neck ROM: full  Possible difficult intubation  Dental      Pulmonary     breath sounds clear to auscultation  (+) a smoker Current,  (-) shortness of breath, recent URI  Cardiovascular     ECG reviewed  Patient on routine beta blocker  Rhythm: regular  Rate: normal    (+) hypertension, dysrhythmias Paroxysmal Atrial Fib, hyperlipidemia  (-) past MI, angina      Neuro/Psych  (-) seizures, CVA  GI/Hepatic/Renal/Endo    (+) obesity, morbid obesityRenal disease: Cr 0.97.    Musculoskeletal     (+) neck pain (s/p fusion)  Abdominal    Substance History      OB/GYN          Other   arthritis,     (-) blood dyscrasia  ROS/Med Hx Other: Previous grade II view, bougie                  Anesthesia Plan    ASA 3     general     (I have reviewed the patient's history and chart with the patient, including all pertinent laboratory results and imaging. I have explained the risks of anesthesia including but not limited to dental damage, corneal abrasion, nerve injury, MI, stroke, aspiration, and death. Patient has agreed to proceed.  )  intravenous induction     Anesthetic plan, risks, benefits, and alternatives have been provided, discussed and informed consent has been obtained with: patient.      CODE STATUS:

## 2024-05-30 NOTE — BRIEF OP NOTE
TRUNK DEBRIDEMENT, WOUND CLOSURE/REPAIR HEAD/NECK  Progress Note    Tre L Judi  5/30/2024    Pre-op Diagnosis:   Acquired deformity of nose [M95.0]       Post-Op Diagnosis Codes:     * Acquired deformity of nose [M95.0]    Procedure/CPT® Codes:        Procedure(s):  DEBRIDEMENT NASAL SCAR 1.5 cm  DE FAT NASAL TIP   DUAL/TRIPLE LAYER NASAL CLOSURE 1.5 cm        Surgeon(s):  Mike Malone MD    Anesthesia: General    Staff:   Circulator: Mandie Bautista RN  Scrub Person: Cait Riggins RN         Estimated Blood Loss: none    Urine Voided: * No values recorded between 5/30/2024  7:55 AM and 5/30/2024  9:51 AM *    Specimens:                None          Drains: * No LDAs found *    Findings: Scar tissue removed from nasal tip        Complications: None          Mike Malone MD     Date: 5/30/2024  Time: 09:54 EDT

## 2024-05-30 NOTE — ANESTHESIA PROCEDURE NOTES
Airway  Urgency: elective    Date/Time: 5/30/2024 8:03 AM  End Time:5/30/2024 8:04 AM  Airway not difficult    General Information and Staff    Patient location during procedure: OR  Anesthesiologist: Feliberto Salinas DO    Indications and Patient Condition  Indications for airway management: airway protection    Preoxygenated: yes  Mask difficulty assessment: 2 - vent by mask + OA or adjuvant +/- NMBA    Final Airway Details  Final airway type: endotracheal airway      Successful airway: ETT and TESS tube  Cuffed: yes   Successful intubation technique: direct laryngoscopy  Facilitating devices/methods: anterior pressure/BURP  Endotracheal tube insertion site: oral  Blade: Fara  Blade size: 3  ETT size (mm): 8.0  Cormack-Lehane Classification: grade IIa - partial view of glottis  Placement verified by: capnometry   Cuff volume (mL): 8  Measured from: lips  Number of attempts at approach: 1  Assessment: lips, teeth, and gum same as pre-op and atraumatic intubation

## 2024-05-31 NOTE — ANESTHESIA POSTPROCEDURE EVALUATION
Patient: Tre Lau    Procedure Summary       Date: 05/30/24 Room / Location: [facility] OR [facility] / [facility]    Anesthesia Start: 0755 Anesthesia Stop: 0958    Procedures:       DEBRIDEMENT NASAL SCAR, DUAL/TRIPLE LAYER NASAL CLOSURE (Nose)      WOUND CLOSURE/REPAIR HEAD/NECK (Nose) Diagnosis:       Acquired deformity of nose      (Acquired deformity of nose [M95.0])    Surgeons: Mike Malone MD Provider: Feliberto Salinas DO    Anesthesia Type: general ASA Status: 3            Anesthesia Type: general    Vitals  Vitals Value Taken Time   /82 05/30/24 1011   Temp 36.6 °C (97.8 °F) 05/30/24 0950   Pulse 73 05/30/24 1011   Resp 16 05/30/24 0950   SpO2 95 % 05/30/24 1011           Post Anesthesia Care and Evaluation    Patient location during evaluation: bedside  Patient participation: complete - patient participated  Level of consciousness: awake and alert  Pain management: adequate    Airway patency: patent  Anesthetic complications: No anesthetic complications  PONV Status: controlled  Cardiovascular status: acceptable and hemodynamically stable  Respiratory status: acceptable, spontaneous ventilation and nonlabored ventilation  Hydration status: acceptable    Comments: BP (!) 145/103   Pulse 71   Temp 36.6 °C (97.8 °F) (Tympanic)   Resp 16   Wt 114 kg (252 lb 6.4 oz)   SpO2 93%   BMI 37.26 kg/m²

## 2024-06-03 ENCOUNTER — TELEPHONE (OUTPATIENT)
Dept: FAMILY MEDICINE CLINIC | Facility: CLINIC | Age: 59
End: 2024-06-03
Payer: COMMERCIAL

## 2024-06-03 DIAGNOSIS — I10 ESSENTIAL HYPERTENSION: ICD-10-CM

## 2024-06-03 RX ORDER — AMLODIPINE BESYLATE 10 MG/1
10 TABLET ORAL DAILY
Qty: 90 TABLET | Refills: 1 | Status: SHIPPED | OUTPATIENT
Start: 2024-06-03

## 2024-06-03 NOTE — TELEPHONE ENCOUNTER
Caller: Tre Lau    Relationship: Self    Best call back number: [phone]     Which medication are you concerned about: amLODIPine (NORVASC) 5 MG tablet     Who prescribed you this medication: PAULA JACQUES    What are your concerns: WENT TO  REFILL AND IT WAS 5MG WHEN HE NORMALLY TAKES 10MG    [id number] [address] AT [facility] & [facility] [phone] [facility] [phone] FX

## 2024-06-05 DIAGNOSIS — I10 ESSENTIAL HYPERTENSION: ICD-10-CM

## 2024-06-05 RX ORDER — METOPROLOL TARTRATE 100 MG/1
100 TABLET ORAL 2 TIMES DAILY
Qty: 180 TABLET | Refills: 1 | Status: SHIPPED | OUTPATIENT
Start: 2024-06-05

## 2024-06-11 ENCOUNTER — OFFICE VISIT (OUTPATIENT)
Dept: WOUND CARE | Facility: HOSPITAL | Age: 59
End: 2024-06-11
Payer: COMMERCIAL

## 2024-06-22 ENCOUNTER — TELEPHONE (OUTPATIENT)
Dept: CARDIOLOGY | Facility: CLINIC | Age: 59
End: 2024-06-22
Payer: COMMERCIAL

## 2024-06-24 RX ORDER — ATORVASTATIN CALCIUM 20 MG/1
TABLET, FILM COATED ORAL
Qty: 90 TABLET | Refills: 2 | Status: SHIPPED | OUTPATIENT
Start: 2024-06-24

## 2024-06-24 NOTE — TELEPHONE ENCOUNTER
He needs a follow-up appointment approximately November for routine follow-up.  He is a patient of Dr. Roberson

## 2024-07-09 ENCOUNTER — OFFICE VISIT (OUTPATIENT)
Dept: WOUND CARE | Facility: HOSPITAL | Age: 59
End: 2024-07-09
Payer: COMMERCIAL

## 2024-10-09 DIAGNOSIS — I10 ESSENTIAL HYPERTENSION: ICD-10-CM

## 2024-10-09 RX ORDER — VALSARTAN 320 MG/1
320 TABLET ORAL DAILY
Qty: 90 TABLET | Refills: 1 | Status: SHIPPED | OUTPATIENT
Start: 2024-10-09

## 2024-10-09 NOTE — TELEPHONE ENCOUNTER
Caller: Tre Lau    Relationship: Self    Best call back number: [phone]     Requested Prescriptions:   Requested Prescriptions     Pending Prescriptions Disp Refills    valsartan (DIOVAN) 320 MG tablet 90 tablet 1     Sig: Take 1 tablet by mouth Daily.        [id number] [place], [place] - [address] AT [facility] [facility] [phone] [facility] [phone] FX     Last office visit with prescribing clinician: 5/13/2024   Last telemedicine visit with prescribing clinician: Visit date not found   Next office visit with prescribing clinician: Visit date not found     Does the patient have less than a 3 day supply:  [x] Yes  [] No    Would you like a call back once the refill request has been completed: [x] Yes [] No    If the office needs to give you a call back, can they leave a voicemail: [x] Yes [] No    Kulwant Novoa Rep   10/09/24 09:34 EDT

## 2024-11-11 ENCOUNTER — OFFICE VISIT (OUTPATIENT)
Dept: CARDIOLOGY | Facility: CLINIC | Age: 59
End: 2024-11-11
Payer: COMMERCIAL

## 2024-11-11 VITALS
DIASTOLIC BLOOD PRESSURE: 80 MMHG | WEIGHT: 251.2 LBS | HEIGHT: 69 IN | SYSTOLIC BLOOD PRESSURE: 120 MMHG | BODY MASS INDEX: 37.2 KG/M2 | HEART RATE: 66 BPM

## 2024-11-11 DIAGNOSIS — I10 ESSENTIAL HYPERTENSION: ICD-10-CM

## 2024-11-11 DIAGNOSIS — I48.0 PAROXYSMAL ATRIAL FIBRILLATION: Primary | ICD-10-CM

## 2024-11-11 PROCEDURE — 99214 OFFICE O/P EST MOD 30 MIN: CPT | Performed by: INTERNAL MEDICINE

## 2024-11-11 PROCEDURE — 93000 ELECTROCARDIOGRAM COMPLETE: CPT | Performed by: INTERNAL MEDICINE

## 2024-11-11 NOTE — PROGRESS NOTES
[facility]      Patient Name: Tre Lau  :1965  Age: 59 y.o.  Encounter Provider:  Leonel Roberson Jr, MD      Chief Complaint:   Chief Complaint   Patient presents with    Paroxysmal atrial fibrillation         HPI  Tre Lau is a 59 y.o. male past medical history of PAF and hypertension who presents for initial evaluation with me.  Previously followed by Dr. Mann and I have reviewed all pertinent electronic health records.  Initially diagnosed with paroxysmal atrial fibrillation in 2015.  He noticed dizziness and shortness of breath with activity and was found to be in atrial fibrillation with RVR.  Spontaneous cardioversion without recurrence to his knowledge.  He is remained on aspirin only for a [id number] score of 1.  Blood pressure and heart rate are well-controlled.  Fair functional capacity.  No orthopnea, PND or edema.  No angina.  No palpitations, dizziness or syncope.  No cardiac complaints at time of interview.  Sinus rhythm on EKG      The following portions of the patient's history were reviewed and updated as appropriate: allergies, current medications, past family history, past medical history, past social history, past surgical history and problem list.      Review of Systems   Constitutional: Negative for chills and fever.   HENT:  Negative for hoarse voice and sore throat.    Eyes:  Negative for double vision and photophobia.   Cardiovascular:  Negative for chest pain, leg swelling, near-syncope, orthopnea, palpitations, paroxysmal nocturnal dyspnea and syncope.   Respiratory:  Negative for cough and wheezing.    Skin:  Negative for poor wound healing and rash.   Musculoskeletal:  Negative for arthritis and joint swelling.   Gastrointestinal:  Negative for bloating, abdominal pain, hematemesis and hematochezia.   Neurological:  Negative for dizziness and focal weakness.   Psychiatric/Behavioral:  Negative for depression and suicidal ideas.      OBJECTIVE:  "  Vital Signs  Vitals:    11/11/24 1239   BP: 120/80   Pulse: 66     Estimated body mass index is 37.1 kg/m² as calculated from the following:    Height as of this encounter: 175.3 cm (69\").    Weight as of this encounter: 114 kg (251 lb 3.2 oz).    Vitals reviewed.   Constitutional:       Appearance: Healthy appearance. Not in distress.   Neck:      Vascular: No JVR. JVD normal.   Pulmonary:      Effort: Pulmonary effort is normal.      Breath sounds: Normal breath sounds. No wheezing. No rhonchi. No rales.   Chest:      Chest wall: Not tender to palpatation.   Cardiovascular:      PMI at left midclavicular line. Normal rate. Regular rhythm. Normal S1. Normal S2.       Murmurs: There is no murmur.      No gallop.  No click. No rub.   Pulses:     Intact distal pulses.   Edema:     Peripheral edema absent.   Abdominal:      General: Bowel sounds are normal.      Palpations: Abdomen is soft.      Tenderness: There is no abdominal tenderness.   Musculoskeletal: Normal range of motion.         General: No tenderness. Skin:     General: Skin is warm and dry.   Neurological:      General: No focal deficit present.      Mental Status: Alert and oriented to person, place and time.       ECG 12 Lead    Date/Time: 11/11/2024 12:43 PM  Performed by: Leonel Roberson Jr., MD    Authorized by: Leonel Roberson Jr., MD  Comparison: compared with previous ECG from 5/16/2023  Similar to previous ECG  Rhythm: sinus rhythm    Clinical impression: normal ECG           BUN   Date Value Ref Range Status   05/21/2024 12 6 - 20 mg/dL Final   12/21/2020 11 7 - 20 mg/dL Final     Creatinine   Date Value Ref Range Status   05/21/2024 0.97 0.76 - 1.27 mg/dL Final   12/21/2020 0.89 0.7 - 1.5 mg/dL Final     Potassium   Date Value Ref Range Status   05/21/2024 4.5 3.5 - 5.2 mmol/L Final   12/21/2020 4.5 3.5 - 5.1 mmol/L Final     ALT (SGPT)   Date Value Ref Range Status   07/26/2023 50 (H) 1 - 41 U/L Final   02/16/2022 20 1 - 41 U/L Final "   12/21/2020 25 0 - 50 U/L Final     AST (SGOT)   Date Value Ref Range Status   07/26/2023 18 1 - 40 U/L Final   02/16/2022 18 1 - 40 U/L Final   12/21/2020 29 15 - 46 U/L Final           ASSESSMENT:     59-year-old male presents for evaluation of PAF    PLAN OF CARE:     PAF -no known recurrence with [id number] score of 1.  Patient would prefer to stay on aspirin which I think is reasonable.  Continue metoprolol.  Essential hypertension   Mixed hyperlipidemia    Return to clinic 12 months with RYAN Juares             Discharge Medications            Accurate as of November 11, 2024 12:42 PM. If you have any questions, ask your nurse or doctor.                Changes to Medications        Instructions Start Date   amLODIPine 10 MG tablet  Commonly known as: NORVASC  What changed: Another medication with the same name was removed. Continue taking this medication, and follow the directions you see here.  Changed by: Leonel Roberson   10 mg, Oral, Daily             Continue These Medications        Instructions Start Date   aspirin 81 MG EC tablet   81 mg, Daily      atorvastatin 20 MG tablet  Commonly known as: LIPITOR   TAKE 1 TABLET BY MOUTH DAILY      HYDROcodone-acetaminophen 5-325 MG per tablet  Commonly known as: Norco   2 tablets, Oral, Every 6 Hours PRN      metoprolol tartrate 100 MG tablet  Commonly known as: LOPRESSOR   100 mg, Oral, 2 Times Daily      valsartan 320 MG tablet  Commonly known as: DIOVAN   320 mg, Oral, Daily               Thank you for allowing me to participate in the care of your patient,      Sincerely,   Leonel Roberson MD  [facility]  11/11/24  12:42 EST

## 2024-12-20 DIAGNOSIS — I10 ESSENTIAL HYPERTENSION: ICD-10-CM

## 2024-12-20 RX ORDER — AMLODIPINE BESYLATE 10 MG/1
10 TABLET ORAL DAILY
Qty: 90 TABLET | Refills: 1 | Status: SHIPPED | OUTPATIENT
Start: 2024-12-20

## 2025-03-05 ENCOUNTER — OFFICE VISIT (OUTPATIENT)
Dept: FAMILY MEDICINE CLINIC | Facility: CLINIC | Age: 60
End: 2025-03-05
Payer: COMMERCIAL

## 2025-03-05 VITALS
RESPIRATION RATE: 16 BRPM | HEART RATE: 68 BPM | WEIGHT: 242 LBS | DIASTOLIC BLOOD PRESSURE: 64 MMHG | BODY MASS INDEX: 35.84 KG/M2 | TEMPERATURE: 97.3 F | OXYGEN SATURATION: 94 % | HEIGHT: 69 IN | SYSTOLIC BLOOD PRESSURE: 118 MMHG

## 2025-03-05 DIAGNOSIS — R05.1 ACUTE COUGH: Primary | ICD-10-CM

## 2025-03-05 DIAGNOSIS — J01.00 ACUTE NON-RECURRENT MAXILLARY SINUSITIS: ICD-10-CM

## 2025-03-05 DIAGNOSIS — J30.89 ENVIRONMENTAL AND SEASONAL ALLERGIES: ICD-10-CM

## 2025-03-05 DIAGNOSIS — J20.9 ACUTE BRONCHITIS, UNSPECIFIED ORGANISM: ICD-10-CM

## 2025-03-05 LAB
EXPIRATION DATE: NORMAL
EXPIRATION DATE: NORMAL
FLUAV AG UPPER RESP QL IA.RAPID: NOT DETECTED
FLUBV AG UPPER RESP QL IA.RAPID: NOT DETECTED
INTERNAL CONTROL: NORMAL
INTERNAL CONTROL: NORMAL
Lab: NORMAL
Lab: NORMAL
S PYO AG THROAT QL: NEGATIVE
SARS-COV-2 AG UPPER RESP QL IA.RAPID: NOT DETECTED

## 2025-03-05 PROCEDURE — 99213 OFFICE O/P EST LOW 20 MIN: CPT | Performed by: STUDENT IN AN ORGANIZED HEALTH CARE EDUCATION/TRAINING PROGRAM

## 2025-03-05 PROCEDURE — 87880 STREP A ASSAY W/OPTIC: CPT | Performed by: STUDENT IN AN ORGANIZED HEALTH CARE EDUCATION/TRAINING PROGRAM

## 2025-03-05 PROCEDURE — 87428 SARSCOV & INF VIR A&B AG IA: CPT | Performed by: STUDENT IN AN ORGANIZED HEALTH CARE EDUCATION/TRAINING PROGRAM

## 2025-03-05 RX ORDER — CETIRIZINE HYDROCHLORIDE 10 MG/1
10 TABLET ORAL NIGHTLY
Qty: 30 TABLET | Refills: 5 | Status: SHIPPED | OUTPATIENT
Start: 2025-03-05

## 2025-03-05 RX ORDER — GUAIFENESIN 600 MG/1
1200 TABLET, EXTENDED RELEASE ORAL 2 TIMES DAILY
Qty: 20 TABLET | Refills: 0 | Status: SHIPPED | OUTPATIENT
Start: 2025-03-05 | End: 2025-03-15

## 2025-03-05 RX ORDER — FLUTICASONE PROPIONATE 50 MCG
2 SPRAY, SUSPENSION (ML) NASAL DAILY
Qty: 16 G | Refills: 11 | Status: SHIPPED | OUTPATIENT
Start: 2025-03-05

## 2025-03-05 RX ORDER — ALBUTEROL SULFATE 90 UG/1
2 INHALANT RESPIRATORY (INHALATION) EVERY 6 HOURS PRN
Qty: 8.7 G | Refills: 5 | Status: SHIPPED | OUTPATIENT
Start: 2025-03-05

## 2025-03-05 RX ORDER — PREDNISONE 10 MG/1
10 TABLET ORAL DAILY
Qty: 3 TABLET | Refills: 0 | Status: SHIPPED | OUTPATIENT
Start: 2025-03-05 | End: 2025-03-08

## 2025-03-05 NOTE — PROGRESS NOTES
"Chief Complaint  Cough (GETS DIZZY, STARTED OVER THE WEEKEND W/ FEVER,UNABLE TO STAY WARM.  SLIGHT PRODUCTIVE BUT NO BLOOD.), Sore Throat (FEELS RAW), and Fatigue    Subjective        Tre Lau presents to [facility]  History of Present Illness  59-year-old white male who usually follows RYAN Moreira is here for acute complaint of cough.    Patient complaining of coughing spells which could lead to dizzy feeling that started over the weekend.  Patient also reports fever and fatigue.  Patient also complaining of raw throat sensation. Pt started with hoarseness on last Friday. Pt drives a school bus.    Patient is asked work excuse.  Cough  This is a new problem. The current episode started in the past 7 days. The problem occurs intermittent. The cough is Productive of sputum. Associated symptoms include a fever, headaches, myalgias, nasal congestion, postnasal drip, a sore throat and shortness of breath. Pertinent negatives include no chest pain, chills, ear congestion, ear pain, heartburn, hemoptysis, rash, rhinorrhea or wheezing. He has tried OTC cough suppressant for the symptoms. The treatment provided moderate relief. His past medical history is significant for environmental allergies.   Sore Throat   Associated symptoms include coughing, headaches and shortness of breath. Pertinent negatives include no ear pain.   Fatigue  Symptoms include cough, fatigue, a fever, headaches, myalgias and sore throat.    Pertinent negative symptoms include no chest pain, no chills and no rash.       Objective   Vital Signs:  /64 (BP Location: Left arm, Patient Position: Sitting, Cuff Size: Adult)   Pulse 68   Temp 97.3 °F (36.3 °C) (Temporal)   Resp 16   Ht 175.3 cm (69.02\")   Wt 110 kg (242 lb)   SpO2 94%   BMI 35.72 kg/m²   Estimated body mass index is 35.72 kg/m² as calculated from the following:    Height as of this encounter: 175.3 cm (69.02\").    Weight as of this " encounter: 110 kg (242 lb).             Physical Exam  Constitutional:       Appearance: Normal appearance.   HENT:      Head: Normocephalic and atraumatic.   Eyes:      Conjunctiva/sclera: Conjunctivae normal.   Cardiovascular:      Rate and Rhythm: Normal rate and regular rhythm.      Heart sounds: Normal heart sounds.   Pulmonary:      Effort: Pulmonary effort is normal.      Breath sounds: Normal breath sounds.   Abdominal:      General: Bowel sounds are normal.      Palpations: Abdomen is soft.      Comments: Non-tender   Skin:     General: Skin is warm.   Neurological:      General: No focal deficit present.      Mental Status: He is alert and oriented to person, place, and time.   Psychiatric:         Mood and Affect: Mood normal.         Behavior: Behavior normal.        Result Review :    The following data was reviewed by: RYAN Araujo on 03/05/2025:  CMP          5/21/2024    12:15   CMP   Glucose 96    BUN 12    Creatinine 0.97    EGFR 90.5    Sodium 142    Potassium 4.5    Chloride 107    Calcium 8.9    BUN/Creatinine Ratio 12.4    Anion Gap 11.8      CBC          5/21/2024    12:15   CBC   WBC 9.60    RBC 4.75    Hemoglobin 14.4    Hematocrit 43.8    MCV 92.2    MCH 30.3    MCHC 32.9    RDW 12.6    Platelets 251                             Assessment and Plan     Diagnoses and all orders for this visit:    1. Acute cough (Primary)  -     POCT SARS-CoV-2 + Flu Antigen FAITH  -     POC Rapid Strep A  -     guaiFENesin (Mucinex) 600 MG 12 hr tablet; Take 2 tablets by mouth 2 (Two) Times a Day for 10 days. Prn congestion.  Dispense: 20 tablet; Refill: 0    2. Acute non-recurrent maxillary sinusitis  -     amoxicillin-clavulanate (AUGMENTIN) 875-125 MG per tablet; Take 1 tablet by mouth 2 (Two) Times a Day for 10 days.  Dispense: 20 tablet; Refill: 0    3. Acute bronchitis, unspecified organism  -     albuterol sulfate  (90 Base) MCG/ACT inhaler; Inhale 2 puffs Every 6 (Six) Hours As  Needed for Wheezing or Shortness of Air.  Dispense: 8.7 g; Refill: 5  -     predniSONE (DELTASONE) 10 MG tablet; Take 1 tablet by mouth Daily for 3 days. Avoid NSAIDS while taking steroids.  Dispense: 3 tablet; Refill: 0    4. Environmental and seasonal allergies  -     fluticasone (FLONASE) 50 MCG/ACT nasal spray; Administer 2 sprays into the nostril(s) as directed by provider Daily.  Dispense: 16 g; Refill: 11  -     cetirizine (zyrTEC) 10 MG tablet; Take 1 tablet by mouth Every Night.  Dispense: 30 tablet; Refill: 5    Counseled patient to monitor his blood pressure while taking prednisone.  Take prednisone only if blood pressure is normal.  Stop prednisone if blood pressure greater than 140/90, patient was understanding.    Instructed patient to Return to Clinic as needed for persistent or new symptoms and/or go to ER for worsening symptoms, patient voiced understanding.          Follow Up     Return in about 3 months (around 6/5/2025) for Next scheduled follow up.  Patient was given instructions and counseling regarding his condition or for health maintenance advice. Please see specific information pulled into the AVS if appropriate.

## 2025-03-24 RX ORDER — ATORVASTATIN CALCIUM 20 MG/1
20 TABLET, FILM COATED ORAL DAILY
Qty: 90 TABLET | Refills: 2 | Status: SHIPPED | OUTPATIENT
Start: 2025-03-24

## 2025-04-01 DIAGNOSIS — I10 ESSENTIAL HYPERTENSION: ICD-10-CM

## 2025-04-01 RX ORDER — VALSARTAN 320 MG/1
320 TABLET ORAL DAILY
Qty: 90 TABLET | Refills: 1 | Status: SHIPPED | OUTPATIENT
Start: 2025-04-01

## 2025-04-18 NOTE — PROGRESS NOTES
Chronic, well-controlled  Current regimen fluoxetine 20 mg daily, trazodone 50 mg at bedtime    Date of Office Visit: 2018  Encounter Provider: RYAN Solano  Place of Service: [facility] CARDIOLOGY  Patient Name: Tre Lau  :1965    Chief Complaint   Patient presents with   • Atrial Fibrillation   :     HPI: Tre Lau is a 52 y.o. male is a patient of Dr. hernandez. I am seeing him for the first time and have reviewed his records.     His past medical history is significant of hypertension, hyperlipidemia, and paroxysmal atrial fibrillation.    In 2015 he presented with tachycardia, shortness of breath, atypical chest pain, and dizziness.  He was in rapid atrial fibrillation and converted back to sinus rhythm spontaneously.  He had a stress echocardiogram that was normal.  His TSH was negative.  He was started on metoprolol and aspirin.    He presents today for annual follow-up. He denies chest pain, palpitations, shortness of breath, dizziness, lightheadedness, fatigue, edema, or blood in the urine or stool.  He continues to have neck pain and bilateral knee pain.  Started back exercising and has been doing walking for 15-30 minutes, weightlifting ( 25 pounds bicep curls), and push ups. He reports that he wants to start riding his bicycle again. He is eager to go out fishing again on his kayak when the weather permits.     No Known Allergies    Past Medical History:   Diagnosis Date   • Atrial fibrillation    • Cervical discitis    • Hyperlipidemia    • Hypertension    • PAF (paroxysmal atrial fibrillation)        Past Surgical History:   Procedure Laterality Date   • CERVICAL FUSION      Dr. Quan Reynolds   • CHOLECYSTECTOMY     • KNEE SURGERY Right 1985    X2, 1986     Family and social history reviewed.     Review of Systems   Cardiovascular: Negative for chest pain.   Respiratory: Negative for shortness of breath.      All other systems were reviewed and are negative        Objective:     Vitals:    18 1010   BP: 128/88   BP  "Location: Left arm   Patient Position: Sitting   Pulse: 60   Weight: 120 kg (264 lb)   Height: 175.3 cm (69\")     Body mass index is 38.99 kg/m².    PHYSICAL EXAM:  Physical Exam   Constitutional: He is oriented to person, place, and time. He appears well-developed and well-nourished. No distress.   HENT:   Head: Normocephalic.   Eyes: Conjunctivae are normal.   Neck: Normal range of motion. No JVD present.   Cardiovascular: Normal rate, regular rhythm, normal heart sounds and intact distal pulses.    No murmur heard.  Pulses:       Carotid pulses are 2+ on the right side, and 2+ on the left side.       Radial pulses are 2+ on the right side, and 2+ on the left side.        Posterior tibial pulses are 2+ on the right side, and 2+ on the left side.   Pulmonary/Chest: Effort normal and breath sounds normal. No respiratory distress. He has no wheezes. He has no rhonchi. He has no rales. He exhibits no tenderness.   Abdominal: Soft. Bowel sounds are normal. He exhibits no distension.   Musculoskeletal: Normal range of motion. He exhibits no edema.   Neurological: He is alert and oriented to person, place, and time.   Skin: Skin is warm, dry and intact. No rash noted. He is not diaphoretic. No cyanosis.   Psychiatric: He has a normal mood and affect. His behavior is normal. Judgment and thought content normal.       ECG 12 Lead  Date/Time: 4/3/2018 10:38 AM  Performed by: NAOMI SEE  Authorized by: NAOMI SEE   Comparison: compared with previous ECG from 4/7/2017  Similar to previous ECG  Rhythm: sinus rhythm  Rate: normal  BPM: 60  ST Segments: ST segments normal  T Waves: T waves normal  QRS axis: normal  Clinical impression: normal ECG          Current Outpatient Prescriptions   Medication Sig Dispense Refill   • aspirin 81 MG tablet Take 1 tablet by mouth daily.     • metoprolol tartrate (LOPRESSOR) 25 MG tablet TAKE 1 TABLET BY MOUTH TWICE DAILY 60 tablet 0   • valsartan (DIOVAN) 80 MG tablet TAKE 1 TABLET BY " MOUTH DAILY 30 tablet 0   • atorvastatin (LIPITOR) 20 MG tablet Take 1 tablet by mouth Daily. 90 tablet 3     No current facility-administered medications for this visit.      Assessment:       Diagnosis Plan   1. Paroxysmal atrial fibrillation     2. Other hyperlipidemia  Lipid Panel    Hepatic Function Panel   3. Essential hypertension          Orders Placed This Encounter   Procedures   • Lipid Panel     Standing Status:   Future     Standing Expiration Date:   4/4/2019   • Hepatic Function Panel     Standing Status:   Future     Standing Expiration Date:   4/3/2019   • ECG 12 Lead     This order was created via procedure documentation         Plan:     1. 52-year-old man with paroxysmal atrial fibrillation.  He was diagnosed in February of 2015 and spontaneously converted to normal sinus rhythm.  He is maintained on Lopressor 25 mg twice daily and aspirin 81 mg. He has had no recurrence.    Atrial Fibrillation and Atrial Flutter  Assessment  • The patient has paroxysmal atrial fibrillation  • This is non-valvular in etiology  • The patient's CHADS2-VASc score is 1  • A [id number] score of 1 is considered an intermediate risk for a thromboembolic event  • Aspirin prescribed    Plan  • Attempt to maintain sinus rhythm  • Continue aspirin for antithrombotic therapy, bleeding issues discussed  • Continue beta blocker for rhythm control    2. Hypertension- on Diovan 80 mg. Blood pressure is adequately controlled at 128/88. Goal is <130/80    3. Hyperlipidemia- he has not taken Lipitor 20 mg for a couple months.  I have sent a refill to his pharmacy and he will have a lipid panel and hepatic panel today since he is fasting.     4. Obesity- he has started back exercising. We discussed a goal of 30-45 mins of moderate exercise such as walking 4 days per week. We discussed that routine exercise and weight loss may help lower his blood pressure also.      Follow up in one year with Dr. Mann    Patient was instructed  to call the office if new symptoms develop or report to nearest ER if heart attack or stroke is suspected.        It has been a pleasure to participate in this patient's care.      Thank you,  RYAN Solano      **[place] Disclaimer:**  Much of this encounter note is an electronic transcription/translation of spoken language to printed text. The electronic translation of spoken language may permit erroneous, or at times, nonsensical words or phrases to be inadvertently transcribed. Although I have reviewed the note for such errors, some may still exist.

## 2025-06-06 ENCOUNTER — OFFICE VISIT (OUTPATIENT)
Dept: FAMILY MEDICINE CLINIC | Facility: CLINIC | Age: 60
End: 2025-06-06
Payer: COMMERCIAL

## 2025-06-06 VITALS
OXYGEN SATURATION: 96 % | DIASTOLIC BLOOD PRESSURE: 74 MMHG | BODY MASS INDEX: 37.23 KG/M2 | TEMPERATURE: 98 F | HEART RATE: 69 BPM | HEIGHT: 69 IN | WEIGHT: 251.4 LBS | SYSTOLIC BLOOD PRESSURE: 104 MMHG

## 2025-06-06 DIAGNOSIS — Z23 IMMUNIZATION DUE: ICD-10-CM

## 2025-06-06 DIAGNOSIS — E78.2 MIXED HYPERLIPIDEMIA: ICD-10-CM

## 2025-06-06 DIAGNOSIS — Z12.5 SCREENING PSA (PROSTATE SPECIFIC ANTIGEN): ICD-10-CM

## 2025-06-06 DIAGNOSIS — I48.0 PAROXYSMAL ATRIAL FIBRILLATION: ICD-10-CM

## 2025-06-06 DIAGNOSIS — I10 ESSENTIAL HYPERTENSION: Primary | ICD-10-CM

## 2025-06-06 DIAGNOSIS — J30.89 ENVIRONMENTAL AND SEASONAL ALLERGIES: ICD-10-CM

## 2025-06-06 RX ORDER — METOPROLOL TARTRATE 100 MG/1
100 TABLET ORAL 2 TIMES DAILY
Qty: 180 TABLET | Refills: 1 | Status: SHIPPED | OUTPATIENT
Start: 2025-06-06

## 2025-06-06 RX ORDER — CETIRIZINE HYDROCHLORIDE 10 MG/1
10 TABLET ORAL NIGHTLY
Qty: 30 TABLET | Refills: 5 | Status: SHIPPED | OUTPATIENT
Start: 2025-06-06

## 2025-06-06 NOTE — PROGRESS NOTES
"Chief Complaint  Hypertension (Follow up /)    Subjective        Tre Lau presents to [facility]  History of Present Illness  Patient presents the office today for follow-up on hypertension.  Current blood pressure today 104/74.  Patient is taking metoprolol 100 mg twice daily.  Amlodipine and valsartan.  Heart rate today 69.  Patient is without chest pain shortness of air.  With atrial fibrillation stable following cardiology, taking metoprolol and aspirin.  With hyperlipidemia continue Lipitor 20 mg.  With seasonal allergies stable with Zyrtec as directed.  Patient due for screening PSA check today.            Objective   Vital Signs:  /74 (BP Location: Left arm, Patient Position: Sitting, Cuff Size: Large Adult)   Pulse 69   Temp 98 °F (36.7 °C)   Ht 175.3 cm (69.02\")   Wt 114 kg (251 lb 6.4 oz)   SpO2 96%   BMI 37.11 kg/m²   Estimated body mass index is 37.11 kg/m² as calculated from the following:    Height as of this encounter: 175.3 cm (69.02\").    Weight as of this encounter: 114 kg (251 lb 6.4 oz).    Class 2 Severe Obesity (BMI >=35 and <=39.9). Obesity-related health conditions include the following: hypertension and dyslipidemias. Obesity is unchanged. BMI is is above average; BMI management plan is completed. We discussed portion control and increasing exercise.      Physical Exam  Constitutional:       General: He is not in acute distress.     Appearance: Normal appearance.   HENT:      Head: Normocephalic.   Eyes:      Pupils: Pupils are equal, round, and reactive to light.   Cardiovascular:      Rate and Rhythm: Normal rate.      Pulses: Normal pulses.      Heart sounds: Normal heart sounds.   Pulmonary:      Effort: Pulmonary effort is normal.      Breath sounds: Normal breath sounds.   Abdominal:      General: Bowel sounds are normal.      Palpations: Abdomen is soft.   Musculoskeletal:         General: Normal range of motion.      Cervical back: Normal " range of motion and neck supple.   Skin:     General: Skin is warm.   Neurological:      General: No focal deficit present.      Mental Status: He is alert and oriented to person, place, and time.   Psychiatric:         Mood and Affect: Mood normal.         Behavior: Behavior normal.         Thought Content: Thought content normal.         Judgment: Judgment normal.        Result Review :  The following data was reviewed by: RYAN Moreira on 06/06/2025:              Assessment and Plan   Diagnoses and all orders for this visit:    1. Essential hypertension (Primary)  Assessment & Plan:  Hypertension is stable and controlled  Continue current treatment regimen.  Blood pressure will be reassessed in 6 months.    Orders:  -     metoprolol tartrate (LOPRESSOR) 100 MG tablet; Take 1 tablet by mouth 2 (Two) Times a Day.  Dispense: 180 tablet; Refill: 1  -     Comprehensive Metabolic Panel    2. Screening PSA (prostate specific antigen)  -     PSA Screen    3. Mixed hyperlipidemia  Assessment & Plan:   Lipid abnormalities are stable    Plan:  Continue same medication/s without change.      Discussed medication dosage, use, side effects, and goals of treatment in detail.    Counseled patient on lifestyle modifications to help control hyperlipidemia.     Patient Treatment Goals:   LDL goal is less than 70    Followup in 6 months.    Orders:  -     Lipid Panel    4. Paroxysmal atrial fibrillation  Assessment & Plan:  Patient is stable, continue asa     Orders:  -     metoprolol tartrate (LOPRESSOR) 100 MG tablet; Take 1 tablet by mouth 2 (Two) Times a Day.  Dispense: 180 tablet; Refill: 1  -     Comprehensive Metabolic Panel    5. Environmental and seasonal allergies  Assessment & Plan:  Stable with Zyrtec as directed.    Orders:  -     CBC & Differential  -     cetirizine (zyrTEC) 10 MG tablet; Take 1 tablet by mouth Every Night.  Dispense: 30 tablet; Refill: 5    6. Immunization due  -     Tdap Vaccine => 8yo IM  (BOOSTRIX/ADACEL)           I spent 15 minutes caring for Tre on this date of service. This time includes time spent by me in the following activities:preparing for the visit, reviewing tests, obtaining and/or reviewing a separately obtained history, performing a medically appropriate examination and/or evaluation , counseling and educating the patient/family/caregiver, ordering medications, tests, or procedures, documenting information in the medical record, independently interpreting results and communicating that information with the patient/family/caregiver, and care coordination  Follow Up   Return in about 6 months (around 12/16/2025) for Recheck.  Patient was given instructions and counseling regarding his condition or for health maintenance advice. Please see specific information pulled into the AVS if appropriate.

## 2025-06-20 PROBLEM — Z23 IMMUNIZATION DUE: Status: ACTIVE | Noted: 2025-06-20

## (undated) DEVICE — CATH IV INSYTE AUTOGARD 14G 1 1/2IN ORNG

## (undated) DEVICE — NDL HYPO PRECISIONGLIDE/REG 30G 1/2 BRN

## (undated) DEVICE — 9165 UNIVERSAL PATIENT PLATE: Brand: 3M™

## (undated) DEVICE — SUT ETHLN 3/0 PS2 18 IN 1669H

## (undated) DEVICE — DRSNG GZ CURAD XEROFORM PETROLTM OVERWRP 1X8IN STRL

## (undated) DEVICE — SUT MNCRYL 4/0 P3 18IN Y494G

## (undated) DEVICE — FLEX ADVANTAGE 1500CC: Brand: FLEX ADVANTAGE

## (undated) DEVICE — GLV SURG BIOGEL SENSR LTX PF SZ7.5

## (undated) DEVICE — SHEET, DRAPE, SPLIT, ULTRAGARD: Brand: MEDLINE

## (undated) DEVICE — GLV SURG BIOGEL LTX PF 7 1/2

## (undated) DEVICE — SUT ETHLN 6/0 P3 18IN 1698G

## (undated) DEVICE — SUT ETHLN 4/0 PC3 18IN 1864G

## (undated) DEVICE — INTENDED FOR TISSUE SEPARATION, AND OTHER PROCEDURES THAT REQUIRE A SHARP SURGICAL BLADE TO PUNCTURE OR CUT.: Brand: BARD-PARKER ® CARBON RIB-BACK BLADES

## (undated) DEVICE — INTENDED FOR TISSUE SEPARATION, AND OTHER PROCEDURES THAT REQUIRE A SHARP SURGICAL BLADE TO PUNCTURE OR CUT.: Brand: BARD-PARKER ® SAFETYLOCK CARBON RIB-BACK BLADES

## (undated) DEVICE — COTTON TIP APPLICATORS: Brand: DEROYAL

## (undated) DEVICE — PK MINOR PROCEDURE 46

## (undated) DEVICE — BLANKT WARM LOWR/BDY 100X120CM

## (undated) DEVICE — GAUZE,SPONGE,4"X4",16PLY,XRAY,STRL,LF: Brand: MEDLINE

## (undated) DEVICE — GAUZE SPONGES,12 PLY: Brand: CURITY

## (undated) DEVICE — SUT ETHLN 5/0 P3 18IN 698H

## (undated) DEVICE — Device: Brand: FABCO

## (undated) DEVICE — SYR LUERLOK 30CC

## (undated) DEVICE — SKIN PROTECTIVE WIPES: Brand: ADAPT

## (undated) DEVICE — CONMED GOLDLINE ELECTROSURGICAL HANDPIECE, HAND CONTROLLED WITH ULTRACLEAN BLADE ELECTRODE, ROCKER SWITCH, SAFETY HOLSTER AND 10' (3 M) CABLE: Brand: CONMED GOLDLINE

## (undated) DEVICE — GOWN,AURORA,NONREINFORCED,XLARGE: Brand: MEDLINE

## (undated) DEVICE — SUREFIT, DUAL DISPERSIVE ELECTRODE, CONTACT QUALITY MONITOR: Brand: SUREFIT

## (undated) DEVICE — SHEET, DRAPE, SPLIT, STERILE: Brand: MEDLINE

## (undated) DEVICE — PENCL SMOKE/EVAC MEGADYNE TELESCP 10FT

## (undated) DEVICE — ELECTRD NDL EZ CLN MOD 2.75IN

## (undated) DEVICE — 3M™ MEDIPORE™ H SOFT CLOTH SURGICAL TAPE 2862, 2 INCH X 10 YARD (5CM X 9,1M), 12 ROLLS/CASE: Brand: 3M™ MEDIPORE™

## (undated) DEVICE — PREP SOL POVIDONE/IODINE BT 4OZ

## (undated) DEVICE — SYRINGE ONLY,20ML LUER LOCK: Brand: MEDLINE INDUSTRIES, INC.

## (undated) DEVICE — SPNG GZ WOVN 4X4IN 12PLY 10/BX STRL

## (undated) DEVICE — X-RAY DETECTABLE SPONGES,16 PLY: Brand: VISTEC

## (undated) DEVICE — SUT MNCRYL 5/0 P3 18 IN Y493G

## (undated) DEVICE — INTRO BOUGIE ET COUDE/TP A/ 15F 70CM LF 10PK

## (undated) DEVICE — 3M™ STERI-STRIP™ REINFORCED ADHESIVE SKIN CLOSURES, R1546, 1/4 IN X 4 IN (6 MM X 100 MM), 10 STRIPS/ENVELOPE: Brand: 3M™ STERI-STRIP™

## (undated) DEVICE — SKIN PREP TRAY 4 COMPARTM TRAY: Brand: MEDLINE INDUSTRIES, INC.

## (undated) DEVICE — TBG PENCL TELESCP MEGADYNE SMOKE EVAC 10FT